# Patient Record
Sex: MALE | Race: WHITE | NOT HISPANIC OR LATINO | Employment: FULL TIME | URBAN - METROPOLITAN AREA
[De-identification: names, ages, dates, MRNs, and addresses within clinical notes are randomized per-mention and may not be internally consistent; named-entity substitution may affect disease eponyms.]

---

## 2017-03-10 LAB — HBA1C MFR BLD: 6.8 % (ref ?–5.8)

## 2017-04-10 ENCOUNTER — OFFICE VISIT (OUTPATIENT)
Dept: MEDICAL GROUP | Age: 57
End: 2017-04-10
Payer: COMMERCIAL

## 2017-04-10 VITALS
HEART RATE: 92 BPM | OXYGEN SATURATION: 96 % | BODY MASS INDEX: 34.21 KG/M2 | SYSTOLIC BLOOD PRESSURE: 130 MMHG | TEMPERATURE: 98.2 F | WEIGHT: 218 LBS | HEIGHT: 67 IN | DIASTOLIC BLOOD PRESSURE: 88 MMHG

## 2017-04-10 DIAGNOSIS — E11.69 DM TYPE 2 WITH DIABETIC DYSLIPIDEMIA (HCC): ICD-10-CM

## 2017-04-10 DIAGNOSIS — I10 ESSENTIAL HYPERTENSION: ICD-10-CM

## 2017-04-10 DIAGNOSIS — E66.9 OBESITY, CLASS II, BMI 35-39.9: ICD-10-CM

## 2017-04-10 DIAGNOSIS — E78.5 DM TYPE 2 WITH DIABETIC DYSLIPIDEMIA (HCC): ICD-10-CM

## 2017-04-10 PROCEDURE — 92250 FUNDUS PHOTOGRAPHY W/I&R: CPT | Mod: TC | Performed by: PHYSICIAN ASSISTANT

## 2017-04-10 PROCEDURE — 99214 OFFICE O/P EST MOD 30 MIN: CPT | Performed by: PHYSICIAN ASSISTANT

## 2017-04-10 RX ORDER — ASPIRIN 81 MG/1
81 TABLET, CHEWABLE ORAL DAILY
Qty: 100 TAB | COMMUNITY
Start: 2017-04-10 | End: 2023-09-18 | Stop reason: SDUPTHER

## 2017-04-10 NOTE — MR AVS SNAPSHOT
"        Jovon Solitario Chacorta   4/10/2017 4:10 PM   Office Visit   MRN: [id number]    Department:  [address] [place] [address]   Dept Phone:  [phone]    Description:  Male : 1960   Provider:  Kasandra Sun PA-C           Reason for Visit     Diabetes Mellitus     Results Blood work done on 3/10/17      Allergies as of 4/10/2017     No Known Allergies      You were diagnosed with     DM type 2 with diabetic dyslipidemia (CMS-[facility])   [291665]       Obesity, Class II, BMI 35-39.9 (CMS-HCC)   [363434]       Essential hypertension   [0219539]         Vital Signs     Blood Pressure Pulse Temperature Height Weight Body Mass Index    130/88 mmHg 92 36.8 °C (98.2 °F) 1.689 m (5' 6.5\") 98.884 kg (218 lb) 34.66 kg/m2    Oxygen Saturation Smoking Status                96% Never Smoker           Basic Information     Date Of Birth Sex Race Ethnicity Preferred Language    1960 Male White Non- English      Your appointments     Oct 16, 2017  3:30 PM   Established Patient with Kasandra Sun PA-C   [address] [id number] [phone]           You will be receiving a confirmation call a few days before your appointment from our automated call confirmation system.              Problem List              ICD-10-CM Priority Class Noted - Resolved    DM type 2 with diabetic dyslipidemia (CMS-[facility]) E11.69, E78.5   7/15/2015 - Present    Obesity, Class II, BMI 35-39.9 (CMS-HCC) E66.01   7/15/2015 - Present    Essential hypertension I10   7/15/2015 - Present    Dyslipidemia E78.5   7/15/2015 - Present    Vitamin D deficiency E55.9   2015 - Present      Health Maintenance        Date Due Completion Dates    RETINAL SCREENING 2016    IMM HEP B VACCINE (1 of 3 - Primary Series) 10/10/2017 (Originally 1960) ---    IMM DTaP/Tdap/Td Vaccine (1 - Tdap) 10/10/2017 (Originally 8/10/1981) ---    URINE ACR / MICROALBUMIN 2017, 2015   " A1C SCREENING 9/10/2017 3/10/2017, 3/10/2017, 8/5/2016, 3/15/2016, 9/29/2015, 6/26/2015, 4/23/2015    FASTING LIPID PROFILE 3/10/2018 3/10/2017, 8/5/2016, 9/29/2015, 6/26/2015, 4/27/2015    SERUM CREATININE 3/10/2018 3/10/2017, 8/5/2016, 9/29/2015, 4/27/2015    DIABETES MONOFILAMENT / LE EXAM 4/10/2018 4/10/2017, 3/16/2016, 7/15/2015 (Done)    Override on 7/15/2015: Done    COLONOSCOPY 6/25/2025 6/25/2015            Current Immunizations     Pneumococcal polysaccharide vaccine (PPSV-23) 3/15/2016 11:00 AM      Below and/or attached are the medications your provider expects you to take. Review all of your home medications and newly ordered medications with your provider and/or pharmacist. Follow medication instructions as directed by your provider and/or pharmacist. Please keep your medication list with you and share with your provider. Update the information when medications are discontinued, doses are changed, or new medications (including over-the-counter products) are added; and carry medication information at all times in the event of emergency situations     Allergies:  No Known Allergies          Medications  Valid as of: April 10, 2017 -  4:15 PM    Generic Name Brand Name Tablet Size Instructions for use    Aspirin (Chew Tab) ASA 81 MG Take 1 Tab by mouth every day.        Blood Glucose Monitoring Suppl (Device) Blood Glucose Monitoring Suppl  Dispense:Glucometer covered by patient's insurance        Blood Glucose Monitoring Suppl (Misc) Blood Glucose Monitoring Suppl SUPPLIES Test strips order: Test strips for glucometer covered by patient's insurance. Sig: use once daily and prn ssx high or low sugar.        Cholecalciferol (Tab) Vitamin D3 2000 UNITS Take  by mouth.        Lancets (Misc) Lancets  Lancets order: Lancets for glucometer covered by patient's insurance. Sig: use once daily and prn ssx high or low sugar.        Lisinopril (Tab) PRINIVIL 10 MG Take 1 Tab by mouth every day.        MetFORMIN HCl  (Tab) GLUCOPHAGE 1000 MG TAKE ONE TABLET BY MOUTH TWICE A DAY WITH MEALS (GENERIC FOR GLUCOPHAGE)        Multiple Vitamin   Take  by mouth.        Simvastatin (Tab) ZOCOR 10 MG Take 1 Tab by mouth every evening.        .                 Medicines prescribed today were sent to:     [facility][id number] - [place] [address]    Phone: [phone] Fax: [phone]    Open 24 Hours?: No      Medication refill instructions:       If your prescription bottle indicates you have medication refills left, it is not necessary to call your provider’s office. Please contact your pharmacy and they will refill your medication.    If your prescription bottle indicates you do not have any refills left, you may request refills at any time through one of the following ways: The online [employer] system (except Urgent Care), by calling your provider’s office, or by asking your pharmacy to contact your provider’s office with a refill request. Medication refills are processed only during regular business hours and may not be available until the next business day. Your provider may request additional information or to have a follow-up visit with you prior to refilling your medication.   *Please Note: Medication refills are assigned a new Rx number when refilled electronically. Your pharmacy may indicate that no refills were authorized even though a new prescription for the same medication is available at the pharmacy. Please request the medicine by name with the pharmacy before contacting your provider for a refill.        Your To Do List     Future Labs/Procedures Complete By Expires    CBC WITH DIFFERENTIAL  As directed 4/11/2018    COMP METABOLIC PANEL  As directed 4/11/2018    HEMOGLOBIN A1C  As directed 4/11/2018    LIPID PROFILE  As directed 4/11/2018    MICROALBUMIN CREAT RATIO URINE  As directed 4/11/2018      Instructions    Dr. Annika Greenberg  Phone: [phone]            [employer] Access  Code: [email or web address]  Expires: 5/10/2017  3:29 PM    [employer]  A secure, online tool to manage your health information     [employer]’s [employer]® is a secure, online tool that connects you to your personalized health information from the privacy of your home -- day or night - making it very easy for you to manage your healthcare. Once the activation process is completed, you can even access your medical information using the [employer] danay, which is available for free in the [employer] Dan[employer] store or Google Play store.     [employer] provides the following levels of access (as shown below):   My Chart Features   [facility] Primary Care Doctor [facility]  Specialists [facility]  Urgent  Care Non-[facility]  Primary Care  Doctor   Email your healthcare team securely and privately 24/7 X X X    Manage appointments: schedule your next appointment; view details of past/upcoming appointments X      Request prescription refills. X      View recent personal medical records, including lab and immunizations X X X X   View health record, including health history, allergies, medications X X X X   Read reports about your outpatient visits, procedures, consult and ER notes X X X X   See your discharge summary, which is a recap of your hospital and/or ER visit that includes your diagnosis, lab results, and care plan. X X       How to register for [employer]:  1. Go to  [email or web address].  2. Click on the Sign Up Now box, which takes you to the New Member Sign Up page. You will need to provide the following information:  a. Enter your [employer] Access Code exactly as it appears at the top of this page. (You will not need to use this code after you’ve completed the sign-up process. If you do not sign up before the expiration date, you must request a new code.)   b. Enter your date of birth.   c. Enter your home email address.   d. Click Submit, and follow the next screen’s instructions.  3. Create a [employer] ID. This will be your [employer] login ID and  cannot be changed, so think of one that is secure and easy to remember.  4. Create a [employer] password. You can change your password at any time.  5. Enter your Password Reset Question and Answer. This can be used at a later time if you forget your password.   6. Enter your e-mail address. This allows you to receive e-mail notifications when new information is available in [employer].  7. Click Sign Up. You can now view your health information.    For assistance activating your [employer] account, call [phone]

## 2017-04-10 NOTE — PROGRESS NOTES
Subjective:     Chief Complaint   Patient presents with   • Diabetes Mellitus   • Results     Blood work done on 3/10/17     Jovon Whatley is a 56 y.o. male here today for evaluation and management of:    DM type 2 with diabetic dyslipidemia (CMS-HCC)/Obesity  Stable. Currently taking metformin 1000 mg twice daily and simvastatin 10 mg nightly as directed.  Denies side effects or hypoglycemic events.  Denies side effects--no myalgias or abdominal pain.  He is monitoring blood sugar regularly at home-- a couple times a month typically under 120.    Reports diet is poor.  Reports he knows what to do he's just not doing it.  He is not exercising regularly, however, reports he has a very active job and was shocked that he is not losing weight.  Last eye exam: May 2015  D enies polyuria, polydipsia, blurred vision, or neuropathies.  He is not taking ASA daily--I recommended that he start today.  He denies dizziness, claudication, or chest pain.      Essential hypertension  Stable. Currently taking lisinopril 10 mg daily as directed.   He is not taking baby aspirin daily.   He is not monitoring BP at home.   Denies symptoms low BP: light-headed, tunnel-vision, unusual fatigue.   Denies symptoms high BP:pounding headache, visual changes, palpitations, flushed face.   Denies medicine side effects: unusual fatigue, slow heartbeat, foot/leg swelling, cough.      ROS   Denies any recent fevers or chills. No nausea or vomiting. No diarrhea. No chest pains or shortness of breath. No lower extremity edema.    No Known Allergies    Current medicines (including changes today)  Current Outpatient Prescriptions   Medication Sig Dispense Refill   • aspirin (ASA) 81 MG Chew Tab chewable tablet Take 1 Tab by mouth every day. 100 Tab    • Multiple Vitamin (MULTI VITAMIN DAILY PO) Take  by mouth.     • simvastatin (ZOCOR) 10 MG Tab Take 1 Tab by mouth every evening. 90 Tab 3   • lisinopril (PRINIVIL) 10 MG Tab Take 1 Tab by mouth  "every day. 90 Tab 4   • metformin (GLUCOPHAGE) 1000 MG tablet TAKE ONE TABLET BY MOUTH TWICE A DAY WITH MEALS (GENERIC FOR GLUCOPHAGE) 180 Tab 1   • Blood Glucose Monitoring Suppl Device Dispense:Glucometer covered by patient's insurance 1 Device 0   • Blood Glucose Monitoring Suppl SUPPLIES Misc Test strips order: Test strips for glucometer covered by patient's insurance. Sig: use once daily and prn ssx high or low sugar. 100 Each 3   • Lancets Misc Lancets order: Lancets for glucometer covered by patient's insurance. Sig: use once daily and prn ssx high or low sugar. 100 Each 3   • Cholecalciferol (VITAMIN D3) 2000 UNITS Tab Take  by mouth.       No current facility-administered medications for this visit.       Patient Active Problem List    Diagnosis Date Noted   • Vitamin D deficiency 07/23/2015   • DM type 2 with diabetic dyslipidemia (CMS-HCC) 07/15/2015   • Obesity, Class II, BMI 35-39.9 (CMS-HCC) 07/15/2015   • Essential hypertension 07/15/2015   • Dyslipidemia 07/15/2015       Family History   Problem Relation Age of Onset   • Other Father      heart valve   • Heart Disease Father    • Cancer Mother      brain cancer   • Diabetes Neg Hx    • Hypertension Neg Hx    • Hyperlipidemia Neg Hx    • Stroke Neg Hx           Objective:     Blood pressure 130/88, pulse 92, temperature 36.8 °C (98.2 °F), height 1.689 m (5' 6.5\"), weight 98.884 kg (218 lb), SpO2 96 %. Body mass index is 34.66 kg/(m^2).     Physical Exam:  Gen: Well developed, well nourished in no acute distress.  Obese male.  Skin: Pink, warm, and dry  HEENT: conjunctiva non-injected, sclera non-icteric. EOMs intact.   Neck: Supple, trachea midline.  Lungs: Effort is normal. Clear to auscultation bilaterally with good excursion.  CV: regular rate and rhythm.  Abdomen: soft, nontender, + BS.   Ext: no edema, color normal, vascularity normal, temperature normal  Monofilament testing with a 10 gram force: sensation intact: intact bilaterally  Visual " Inspection: Feet without maceration, ulcers, fissures.  Pedal pulses: intact bilaterally  Alert and oriented Eye contact is good, speech goal directed, affect calm    Lab Results   Component Value Date/Time    GLYCOHEMOGLOBIN 6.8 03/10/2017      Reviewed and discussed above labs with patient in office.      Assessment and Plan:   The following treatment plan was discussed:     1. DM type 2 with diabetic dyslipidemia (CMS-HCC) -  Stable. Continue current medicines. Monitor labs regularly.  -Monofilament exam completed in office today.  Clinic care retinal screening also completed an office today.    - Labs ordered to be completed prior to next office visit in 6 months.    - Advised patient to start aspirin 81 mg daily .  POCT Retinal Eye Exam    Diabetic Monofilament LE Exam    MICROALBUMIN CREAT RATIO URINE    HEMOGLOBIN A1C    COMP METABOLIC PANEL    CBC WITH DIFFERENTIAL    LIPID PROFILE   2. Obesity, Class II, BMI 35-39.9 (CMS-HCC) -Counseled on diet modification and exercise.    Obesity Counseling (no charge) - Patient identified as having weight management issue.  Appropriate orders and counseling given.   3. Essential hypertension -Stable. Continue current medicines. Monitor labs regularly. COMP METABOLIC PANEL     - HM: Discussed hepatitis B in Tdap vaccines today an office at great length.  He would like to defer at this time due to his needle phobia.  We will discuss again at next office visit.  Health maintenance otherwise up to date as the retinal eye exam was completed in office.  -Any change or worsening of signs or symptoms, patient encouraged to follow-up or report to emergency room for further evaluation. Patient verbalizes understanding and agrees.    Followup: Return in about 6 months (around 10/10/2017) for lab review, follow-up on DM.

## 2017-04-10 NOTE — Clinical Note
April 10, 2017       Patient: Jovon Whatley   YOB: 1960   Date of Visit: 4/10/2017         To Whom It May Concern:    It is my medical opinion that Jovon Whatley return to full duty, no restrictions. It is recommended that he wear a back support brace.    If you have any questions or concerns, please don't hesitate to call [phone]          Sincerely,          Kasandra Sun PA-C  Electronically Signed

## 2017-04-17 LAB — RETINAL SCREEN: NEGATIVE

## 2017-04-18 ENCOUNTER — TELEPHONE (OUTPATIENT)
Dept: MEDICAL GROUP | Age: 57
End: 2017-04-18

## 2017-04-18 NOTE — TELEPHONE ENCOUNTER
Phone Number Called: [phone] (home)     Message: Left VM for patient to return our call    Left Message for patient to call back: yes

## 2017-04-18 NOTE — TELEPHONE ENCOUNTER
----- Message from Kasandra Sun PA-C sent at 4/18/2017 10:09 AM PDT -----  Advise patient his eye exam was normal. We or his eyecare professional will need to recheck in 1 year

## 2017-04-19 NOTE — TELEPHONE ENCOUNTER
Phone Number Called: [phone] (home)     Message: Left message for the patient to call us back regarding the note below.    Left Message for patient to call back: yes

## 2017-04-20 NOTE — TELEPHONE ENCOUNTER
Phone Number Called: [phone] (home)     Message: I have called the patient back, returning his voicemail. Left message for him to call us back.    Left Message for patient to call back: yes

## 2017-04-20 NOTE — TELEPHONE ENCOUNTER
RADHAI    Phone Number Called: [phone] (home)     Message: Pt notified of the his eye results.    Left Message for patient to call back: no

## 2017-09-12 LAB — HBA1C MFR BLD: 6.6 % (ref ?–5.8)

## 2017-10-18 DIAGNOSIS — E78.5 DYSLIPIDEMIA: ICD-10-CM

## 2017-10-18 DIAGNOSIS — I10 ESSENTIAL HYPERTENSION: ICD-10-CM

## 2017-10-18 DIAGNOSIS — E78.5 DM TYPE 2 WITH DIABETIC DYSLIPIDEMIA (HCC): ICD-10-CM

## 2017-10-18 DIAGNOSIS — E11.69 DM TYPE 2 WITH DIABETIC DYSLIPIDEMIA (HCC): ICD-10-CM

## 2017-10-18 RX ORDER — SIMVASTATIN 10 MG
TABLET ORAL
Qty: 90 TAB | Refills: 0 | Status: SHIPPED | OUTPATIENT
Start: 2017-10-18 | End: 2018-01-22 | Stop reason: SDUPTHER

## 2017-10-18 RX ORDER — LISINOPRIL 10 MG/1
TABLET ORAL
Qty: 90 TAB | Refills: 0 | Status: SHIPPED | OUTPATIENT
Start: 2017-10-18 | End: 2018-01-22 | Stop reason: SDUPTHER

## 2017-10-24 ENCOUNTER — OFFICE VISIT (OUTPATIENT)
Dept: MEDICAL GROUP | Age: 57
End: 2017-10-24
Payer: COMMERCIAL

## 2017-10-24 VITALS
HEIGHT: 67 IN | BODY MASS INDEX: 34.37 KG/M2 | DIASTOLIC BLOOD PRESSURE: 70 MMHG | SYSTOLIC BLOOD PRESSURE: 120 MMHG | WEIGHT: 219 LBS | TEMPERATURE: 97.4 F | HEART RATE: 96 BPM | OXYGEN SATURATION: 96 %

## 2017-10-24 DIAGNOSIS — I10 ESSENTIAL HYPERTENSION: ICD-10-CM

## 2017-10-24 DIAGNOSIS — E66.9 OBESITY (BMI 30-39.9): ICD-10-CM

## 2017-10-24 DIAGNOSIS — E11.69 DM TYPE 2 WITH DIABETIC DYSLIPIDEMIA (HCC): ICD-10-CM

## 2017-10-24 DIAGNOSIS — E78.5 DM TYPE 2 WITH DIABETIC DYSLIPIDEMIA (HCC): ICD-10-CM

## 2017-10-24 DIAGNOSIS — Z12.5 SCREENING FOR PROSTATE CANCER: ICD-10-CM

## 2017-10-24 PROCEDURE — 99214 OFFICE O/P EST MOD 30 MIN: CPT | Performed by: PHYSICIAN ASSISTANT

## 2017-10-24 ASSESSMENT — PATIENT HEALTH QUESTIONNAIRE - PHQ9: CLINICAL INTERPRETATION OF PHQ2 SCORE: 0

## 2017-10-24 NOTE — PROGRESS NOTES
Subjective:     Chief Complaint   Patient presents with   • Diabetes Mellitus     Lab review     Jovon Whatley is a 57 y.o. male here today for evaluation and management of:    DM type 2 with diabetic dyslipidemia (CMS-HCC)/Obesity  Stable. Currently taking Metformin 1000 mg BID and simvastatin 10 mg as directed.  Denies side effects or hypoglycemic events.  He is monitoring blood sugar regularly at home-- a couple times a week in 130s  Reports diet is suffering  He is not exercising regularly outside of his active job.  Last eye exam: 4/17/17  Denies polyuria, polydipsia, blurred vision, or neuropathies.  He is taking ASA daily.  He denies dizziness, claudication, or chest pain.    Essential hypertension  Stable. Currently taking lisinopril 10 mg daily as directed.   He is taking baby aspirin daily.   He is not monitoring BP at home.   Denies symptoms low BP: light-headed, tunnel-vision, unusual fatigue.   Denies symptoms high BP:pounding headache, visual changes, palpitations, flushed face.   Denies medicine side effects: unusual fatigue, slow heartbeat, foot/leg swelling, cough.    ROS   Denies any recent fevers or chills. No nausea or vomiting. No diarrhea. No chest pains or shortness of breath. No lower extremity edema.    No Known Allergies    Current medicines (including changes today)  Current Outpatient Prescriptions   Medication Sig Dispense Refill   • metformin (GLUCOPHAGE) 1000 MG tablet TAKE ONE TABLET BY MOUTH TWICE A DAY WITH MEALS 180 Tab 1   • simvastatin (ZOCOR) 10 MG Tab TAKE ONE TABLET BY MOUTH EVERY EVENING 90 Tab 0   • lisinopril (PRINIVIL) 10 MG Tab TAKE ONE TABLET BY MOUTH DAILY 90 Tab 0   • aspirin (ASA) 81 MG Chew Tab chewable tablet Take 1 Tab by mouth every day. 100 Tab    • Multiple Vitamin (MULTI VITAMIN DAILY PO) Take  by mouth.     • Blood Glucose Monitoring Suppl Device Dispense:Glucometer covered by patient's insurance 1 Device 0   • Blood Glucose Monitoring Suppl SUPPLIES  "Misc Test strips order: Test strips for glucometer covered by patient's insurance. Sig: use once daily and prn ssx high or low sugar. 100 Each 3   • Lancets Misc Lancets order: Lancets for glucometer covered by patient's insurance. Sig: use once daily and prn ssx high or low sugar. 100 Each 3   • Cholecalciferol (VITAMIN D3) 2000 UNITS Tab Take  by mouth.       No current facility-administered medications for this visit.        Patient Active Problem List    Diagnosis Date Noted   • Obesity (BMI 30-39.9) 10/24/2017   • Vitamin D deficiency 07/23/2015   • DM type 2 with diabetic dyslipidemia (CMS-HCC) 07/15/2015   • Essential hypertension 07/15/2015   • Dyslipidemia 07/15/2015       Family History   Problem Relation Age of Onset   • Other Father      heart valve   • Heart Disease Father    • Cancer Mother      brain cancer   • Diabetes Neg Hx    • Hypertension Neg Hx    • Hyperlipidemia Neg Hx    • Stroke Neg Hx           Objective:     Blood pressure 120/70, pulse 96, temperature 36.3 °C (97.4 °F), height 1.689 m (5' 6.5\"), weight 99.3 kg (219 lb), SpO2 96 %. Body mass index is 34.82 kg/m².     Physical Exam:  Gen: Well developed, well nourished in no acute distress. Obese male.  Skin: Pink, warm, and dry  HEENT: conjunctiva non-injected, sclera non-icteric. EOMs intact.   Neck: Supple, trachea midline. No adenopathy or masses in the neck or supraclavicular regions.  Lungs: Effort is normal. Clear to auscultation bilaterally with good excursion.  CV: regular rate and rhythm.  Abdomen: soft, nontender, + BS. No HSM.  No CVAT  Ext: no edema, color normal, vascularity normal, temperature normal  Alert and oriented Eye contact is good, speech goal directed, affect calm    Lab Results   Component Value Date/Time    HBA1C 6.6 09/12/2017       Assessment and Plan:   The following treatment plan was discussed:     1. DM type 2 with diabetic dyslipidemia (CMS-HCC) -Stable. Continue current medicines. Monitor labs regularly. " COMP METABOLIC PANEL    LIPID PROFILE    MICROALBUMIN CREAT RATIO URINE    HEMOGLOBIN A1C   2. Essential hypertension -Stable. Continue current medicines. Monitor labs regularly. COMP METABOLIC PANEL    CBC WITH DIFFERENTIAL   3. Obesity (BMI 30-39.9) - Patient's body mass index is 34.82 kg/m². Exercise and nutrition counseling were performed at this visit.  - Encouraged diet high in fruits, vegetables, and fiber. And a diet low in salt, refined carbohydrates, cholesterol, saturated fat, and trans fatty acids.    - Encouraged 10 lbs weight loss by next appointment in April.  Patient identified as having weight management issue.  Appropriate orders and counseling given.   4. Screening for prostate cancer-discussed pros and cons with patient he would like labs ordered  PROSTATE SPECIFIC AG SCREENING     - HM: Declines Hep B and Tdap vaccines in addition to flu shot.  -Any change or worsening of signs or symptoms, patient encouraged to follow-up or report to emergency room for further evaluation. Patient verbalizes understanding and agrees.    Followup: Return in about 6 months (around 4/24/2018) for lab review. sooner if needed.

## 2018-01-22 DIAGNOSIS — E11.69 DM TYPE 2 WITH DIABETIC DYSLIPIDEMIA (HCC): ICD-10-CM

## 2018-01-22 DIAGNOSIS — E78.5 DYSLIPIDEMIA: ICD-10-CM

## 2018-01-22 DIAGNOSIS — I10 ESSENTIAL HYPERTENSION: ICD-10-CM

## 2018-01-22 DIAGNOSIS — E78.5 DM TYPE 2 WITH DIABETIC DYSLIPIDEMIA (HCC): ICD-10-CM

## 2018-01-22 RX ORDER — LISINOPRIL 10 MG/1
TABLET ORAL
Qty: 30 TAB | Refills: 0 | Status: SHIPPED | OUTPATIENT
Start: 2018-01-22 | End: 2018-02-21 | Stop reason: SDUPTHER

## 2018-01-22 RX ORDER — SIMVASTATIN 10 MG
TABLET ORAL
Qty: 30 TAB | Refills: 0 | Status: SHIPPED | OUTPATIENT
Start: 2018-01-22 | End: 2018-02-21 | Stop reason: SDUPTHER

## 2018-02-21 DIAGNOSIS — E78.5 DYSLIPIDEMIA: ICD-10-CM

## 2018-02-21 DIAGNOSIS — E11.69 DM TYPE 2 WITH DIABETIC DYSLIPIDEMIA (HCC): ICD-10-CM

## 2018-02-21 DIAGNOSIS — I10 ESSENTIAL HYPERTENSION: ICD-10-CM

## 2018-02-21 DIAGNOSIS — E78.5 DM TYPE 2 WITH DIABETIC DYSLIPIDEMIA (HCC): ICD-10-CM

## 2018-02-26 RX ORDER — LISINOPRIL 10 MG/1
TABLET ORAL
Qty: 30 TAB | Refills: 0 | Status: SHIPPED | OUTPATIENT
Start: 2018-02-26 | End: 2018-03-27 | Stop reason: SDUPTHER

## 2018-02-26 RX ORDER — SIMVASTATIN 10 MG
TABLET ORAL
Qty: 30 TAB | Refills: 0 | Status: SHIPPED | OUTPATIENT
Start: 2018-02-26 | End: 2018-03-27 | Stop reason: SDUPTHER

## 2018-03-17 ENCOUNTER — APPOINTMENT (OUTPATIENT)
Dept: RADIOLOGY | Facility: IMAGING CENTER | Age: 58
End: 2018-03-17
Attending: NURSE PRACTITIONER
Payer: COMMERCIAL

## 2018-03-17 ENCOUNTER — OFFICE VISIT (OUTPATIENT)
Dept: URGENT CARE | Facility: PHYSICIAN GROUP | Age: 58
End: 2018-03-17
Payer: COMMERCIAL

## 2018-03-17 VITALS
BODY MASS INDEX: 36 KG/M2 | WEIGHT: 224 LBS | OXYGEN SATURATION: 95 % | SYSTOLIC BLOOD PRESSURE: 130 MMHG | DIASTOLIC BLOOD PRESSURE: 80 MMHG | HEART RATE: 94 BPM | TEMPERATURE: 98 F | HEIGHT: 66 IN

## 2018-03-17 DIAGNOSIS — M54.2 CERVICAL PAIN (NECK): ICD-10-CM

## 2018-03-17 DIAGNOSIS — M62.838 MUSCLE SPASMS OF NECK: ICD-10-CM

## 2018-03-17 DIAGNOSIS — M50.30 DEGENERATIVE CERVICAL DISC: ICD-10-CM

## 2018-03-17 PROCEDURE — 72040 X-RAY EXAM NECK SPINE 2-3 VW: CPT | Mod: TC | Performed by: NURSE PRACTITIONER

## 2018-03-17 PROCEDURE — 99214 OFFICE O/P EST MOD 30 MIN: CPT | Performed by: NURSE PRACTITIONER

## 2018-03-17 RX ORDER — VALACYCLOVIR HYDROCHLORIDE 1 G/1
1000 TABLET, FILM COATED ORAL 3 TIMES DAILY
Qty: 21 TAB | Refills: 0 | Status: SHIPPED | OUTPATIENT
Start: 2018-03-17 | End: 2018-03-17 | Stop reason: CLARIF

## 2018-03-17 RX ORDER — CYCLOBENZAPRINE HCL 10 MG
10 TABLET ORAL
Qty: 15 TAB | Refills: 0 | Status: SHIPPED | OUTPATIENT
Start: 2018-03-17 | End: 2018-04-24

## 2018-03-17 ASSESSMENT — PAIN SCALES - GENERAL: PAINLEVEL: 9=SEVERE PAIN

## 2018-03-17 ASSESSMENT — ENCOUNTER SYMPTOMS
CHILLS: 0
TINGLING: 0
NUMBNESS: 0
NAUSEA: 0
SHORTNESS OF BREATH: 0
EYE PAIN: 0
SORE THROAT: 0
MYALGIAS: 0
TROUBLE SWALLOWING: 0
FEVER: 0
PHOTOPHOBIA: 0
DIZZINESS: 0
NECK PAIN: 1
VOMITING: 0
VISUAL CHANGE: 0

## 2018-03-17 NOTE — LETTER
March 17, 2018         Patient: Jovon Whatley   YOB: 1960   Date of Visit: 3/17/2018           To Whom it May Concern:    Jovon Whatley was seen in my clinic on 3/17/2018. He may return to work on 3/18/18 with no lifting of heavy objects for 2 weeks.    If you have any questions or concerns, please don't hesitate to call.        Sincerely,           FIGUEROA Andino.  Electronically Signed

## 2018-03-17 NOTE — PROGRESS NOTES
"  Subjective:     Jovon Whatley is a 57 y.o. male who presents for Neck Pain (\"cracking\" about a month ago, and then it locked up yesterday, been excruciating since )       Neck Pain    This is a new problem. The current episode started yesterday. The problem occurs constantly. The problem has been unchanged. The pain is associated with a twisting injury (\"cracked his neck). The pain is present in the left side. The pain is at a severity of 9/10. The pain is severe. The symptoms are aggravated by twisting and position. The pain is same all the time. Stiffness is present all day. Pertinent negatives include no chest pain, fever, numbness, photophobia, tingling, trouble swallowing or visual change. He has tried NSAIDs for the symptoms. The treatment provided no relief.     Past Medical History:   Diagnosis Date   • Diabetes (CMS-HCC)    • Hyperlipidemia    • Hypertension      Past Surgical History:   Procedure Laterality Date   • ANKLE ORIF Right    • APPENDECTOMY       Social History     Social History   • Marital status:      Spouse name: N/A   • Number of children: N/A   • Years of education: N/A     Occupational History   • Not on file.     Social History Main Topics   • Smoking status: Never Smoker   • Smokeless tobacco: Never Used   • Alcohol use No   • Drug use: No   • Sexual activity: No     Other Topics Concern   • Not on file     Social History Narrative   • No narrative on file      Family History   Problem Relation Age of Onset   • Other Father      heart valve   • Heart Disease Father    • Cancer Mother      brain cancer   • Diabetes Neg Hx    • Hypertension Neg Hx    • Hyperlipidemia Neg Hx    • Stroke Neg Hx     Review of Systems   Constitutional: Negative for chills and fever.   HENT: Negative for sore throat and trouble swallowing.    Eyes: Negative for photophobia and pain.   Respiratory: Negative for shortness of breath.    Cardiovascular: Negative for chest pain.   Gastrointestinal: " "Negative for nausea and vomiting.   Genitourinary: Negative for hematuria.   Musculoskeletal: Positive for neck pain. Negative for myalgias.   Skin: Negative for rash.   Neurological: Negative for dizziness, tingling and numbness.   No Known Allergies   Objective:   /80   Pulse 94   Temp 36.7 °C (98 °F)   Ht 1.676 m (5' 6\")   Wt 101.6 kg (224 lb)   SpO2 95%   BMI 36.15 kg/m²   Physical Exam   Constitutional: He is oriented to person, place, and time. He appears well-developed and well-nourished. No distress.   HENT:   Head: Normocephalic and atraumatic.   Eyes: Conjunctivae and EOM are normal. Pupils are equal, round, and reactive to light.   Cardiovascular: Normal rate and regular rhythm.    No murmur heard.  Pulmonary/Chest: Effort normal and breath sounds normal. No respiratory distress.   Abdominal: Soft. He exhibits no distension. There is no tenderness.   Musculoskeletal:        Cervical back: He exhibits decreased range of motion, tenderness, pain and spasm. He exhibits no swelling and no edema.   Spine- without midline tenderness, step-off or deformity. Without scoliosis or kyphosis. Without noted paraspinous spasm. DROM with lateral bend, and flexion/extension.Sensation intact bilaterally, BLE motor 5/5 and symmetrical  strength.   Neurological: He is alert and oriented to person, place, and time. He has normal reflexes. No sensory deficit.   Skin: Skin is warm and dry.   Psychiatric: He has a normal mood and affect.   Vitals reviewed.        Assessment/Plan:   Assessment    1. Cervical pain (neck)  DX-CERVICAL SPINE-2 OR 3 VIEWS    cyclobenzaprine (FLEXERIL) 10 MG Tab    REFERRAL TO SPINE SURGERY   2. Muscle spasms of neck  cyclobenzaprine (FLEXERIL) 10 MG Tab    REFERRAL TO SPINE SURGERY   3. Degenerative cervical disc       Degenerative changes as above described.  If clinical suspicion for fracture is high, further evaluation can be performed with CT.    Clinical suspicion low for fracture. " Patient having no direct trauma.  Avoid bending, stooping, heavy or repetitive lifting until symptoms resolve.  Begin gentle stretching before activity when tolerated.  Did provide patient with a work letter advising no heavy lifting ×2 weeks.     Will have patient follow up with [facility] for further evaluation and management of cervical neck pain. Advised patient taking Tylenol and/or ibuprofen for pain or discomfort.     Differential diagnosis, natural history, supportive care, and indications for immediate follow-up discussed.

## 2018-03-27 DIAGNOSIS — E78.5 DYSLIPIDEMIA: ICD-10-CM

## 2018-03-27 DIAGNOSIS — E78.5 DM TYPE 2 WITH DIABETIC DYSLIPIDEMIA (HCC): ICD-10-CM

## 2018-03-27 DIAGNOSIS — I10 ESSENTIAL HYPERTENSION: ICD-10-CM

## 2018-03-27 DIAGNOSIS — E11.69 DM TYPE 2 WITH DIABETIC DYSLIPIDEMIA (HCC): ICD-10-CM

## 2018-03-30 RX ORDER — SIMVASTATIN 10 MG
TABLET ORAL
Qty: 30 TAB | Refills: 0 | Status: SHIPPED | OUTPATIENT
Start: 2018-03-30 | End: 2018-04-25 | Stop reason: SDUPTHER

## 2018-03-30 RX ORDER — LISINOPRIL 10 MG/1
TABLET ORAL
Qty: 30 TAB | Refills: 0 | Status: SHIPPED | OUTPATIENT
Start: 2018-03-30 | End: 2018-04-25 | Stop reason: SDUPTHER

## 2018-04-18 ENCOUNTER — HOSPITAL ENCOUNTER (OUTPATIENT)
Dept: LAB | Facility: MEDICAL CENTER | Age: 58
End: 2018-04-18
Attending: PHYSICIAN ASSISTANT
Payer: COMMERCIAL

## 2018-04-18 DIAGNOSIS — Z12.5 SCREENING FOR PROSTATE CANCER: ICD-10-CM

## 2018-04-18 DIAGNOSIS — E11.69 DM TYPE 2 WITH DIABETIC DYSLIPIDEMIA (HCC): ICD-10-CM

## 2018-04-18 DIAGNOSIS — I10 ESSENTIAL HYPERTENSION: ICD-10-CM

## 2018-04-18 DIAGNOSIS — E78.5 DM TYPE 2 WITH DIABETIC DYSLIPIDEMIA (HCC): ICD-10-CM

## 2018-04-18 LAB
ALBUMIN SERPL BCP-MCNC: 4.4 G/DL (ref 3.2–4.9)
ALBUMIN/GLOB SERPL: 1.6 G/DL
ALP SERPL-CCNC: 63 U/L (ref 30–99)
ALT SERPL-CCNC: 41 U/L (ref 2–50)
ANION GAP SERPL CALC-SCNC: 9 MMOL/L (ref 0–11.9)
AST SERPL-CCNC: 25 U/L (ref 12–45)
BASOPHILS # BLD AUTO: 0.6 % (ref 0–1.8)
BASOPHILS # BLD: 0.07 K/UL (ref 0–0.12)
BILIRUB SERPL-MCNC: 0.6 MG/DL (ref 0.1–1.5)
BUN SERPL-MCNC: 13 MG/DL (ref 8–22)
CALCIUM SERPL-MCNC: 9.5 MG/DL (ref 8.5–10.5)
CHLORIDE SERPL-SCNC: 104 MMOL/L (ref 96–112)
CHOLEST SERPL-MCNC: 120 MG/DL (ref 100–199)
CO2 SERPL-SCNC: 24 MMOL/L (ref 20–33)
CREAT SERPL-MCNC: 0.8 MG/DL (ref 0.5–1.4)
CREAT UR-MCNC: 225.4 MG/DL
EOSINOPHIL # BLD AUTO: 0.17 K/UL (ref 0–0.51)
EOSINOPHIL NFR BLD: 1.5 % (ref 0–6.9)
ERYTHROCYTE [DISTWIDTH] IN BLOOD BY AUTOMATED COUNT: 43.8 FL (ref 35.9–50)
EST. AVERAGE GLUCOSE BLD GHB EST-MCNC: 174 MG/DL
GLOBULIN SER CALC-MCNC: 2.7 G/DL (ref 1.9–3.5)
GLUCOSE SERPL-MCNC: 159 MG/DL (ref 65–99)
HBA1C MFR BLD: 7.7 % (ref 0–5.6)
HCT VFR BLD AUTO: 45.4 % (ref 42–52)
HDLC SERPL-MCNC: 37 MG/DL
HGB BLD-MCNC: 14.6 G/DL (ref 14–18)
IMM GRANULOCYTES # BLD AUTO: 0.04 K/UL (ref 0–0.11)
IMM GRANULOCYTES NFR BLD AUTO: 0.4 % (ref 0–0.9)
LDLC SERPL CALC-MCNC: 52 MG/DL
LYMPHOCYTES # BLD AUTO: 2.06 K/UL (ref 1–4.8)
LYMPHOCYTES NFR BLD: 18.5 % (ref 22–41)
MCH RBC QN AUTO: 27.8 PG (ref 27–33)
MCHC RBC AUTO-ENTMCNC: 32.2 G/DL (ref 33.7–35.3)
MCV RBC AUTO: 86.3 FL (ref 81.4–97.8)
MICROALBUMIN UR-MCNC: 1.3 MG/DL
MICROALBUMIN/CREAT UR: 6 MG/G (ref 0–30)
MONOCYTES # BLD AUTO: 1.2 K/UL (ref 0–0.85)
MONOCYTES NFR BLD AUTO: 10.8 % (ref 0–13.4)
NEUTROPHILS # BLD AUTO: 7.61 K/UL (ref 1.82–7.42)
NEUTROPHILS NFR BLD: 68.2 % (ref 44–72)
NRBC # BLD AUTO: 0 K/UL
NRBC BLD-RTO: 0 /100 WBC
PLATELET # BLD AUTO: 243 K/UL (ref 164–446)
PMV BLD AUTO: 11.2 FL (ref 9–12.9)
POTASSIUM SERPL-SCNC: 3.9 MMOL/L (ref 3.6–5.5)
PROT SERPL-MCNC: 7.1 G/DL (ref 6–8.2)
PSA SERPL-MCNC: 1.07 NG/ML (ref 0–4)
RBC # BLD AUTO: 5.26 M/UL (ref 4.7–6.1)
SODIUM SERPL-SCNC: 137 MMOL/L (ref 135–145)
TRIGL SERPL-MCNC: 156 MG/DL (ref 0–149)
WBC # BLD AUTO: 11.2 K/UL (ref 4.8–10.8)

## 2018-04-18 PROCEDURE — 36415 COLL VENOUS BLD VENIPUNCTURE: CPT

## 2018-04-18 PROCEDURE — 80053 COMPREHEN METABOLIC PANEL: CPT

## 2018-04-18 PROCEDURE — 80061 LIPID PANEL: CPT

## 2018-04-18 PROCEDURE — 84153 ASSAY OF PSA TOTAL: CPT

## 2018-04-18 PROCEDURE — 83036 HEMOGLOBIN GLYCOSYLATED A1C: CPT

## 2018-04-18 PROCEDURE — 82043 UR ALBUMIN QUANTITATIVE: CPT

## 2018-04-18 PROCEDURE — 82570 ASSAY OF URINE CREATININE: CPT

## 2018-04-18 PROCEDURE — 85025 COMPLETE CBC W/AUTO DIFF WBC: CPT

## 2018-04-24 ENCOUNTER — OFFICE VISIT (OUTPATIENT)
Dept: MEDICAL GROUP | Age: 58
End: 2018-04-24
Payer: COMMERCIAL

## 2018-04-24 VITALS
BODY MASS INDEX: 35.58 KG/M2 | HEIGHT: 66 IN | HEART RATE: 77 BPM | TEMPERATURE: 98.4 F | WEIGHT: 221.4 LBS | SYSTOLIC BLOOD PRESSURE: 132 MMHG | DIASTOLIC BLOOD PRESSURE: 78 MMHG | OXYGEN SATURATION: 97 %

## 2018-04-24 DIAGNOSIS — E11.69 DM TYPE 2 WITH DIABETIC DYSLIPIDEMIA (HCC): ICD-10-CM

## 2018-04-24 DIAGNOSIS — E78.5 DM TYPE 2 WITH DIABETIC DYSLIPIDEMIA (HCC): ICD-10-CM

## 2018-04-24 DIAGNOSIS — I10 ESSENTIAL HYPERTENSION: ICD-10-CM

## 2018-04-24 DIAGNOSIS — E66.9 OBESITY (BMI 30-39.9): ICD-10-CM

## 2018-04-24 DIAGNOSIS — J06.9 ACUTE URI: ICD-10-CM

## 2018-04-24 PROCEDURE — 99214 OFFICE O/P EST MOD 30 MIN: CPT | Performed by: PHYSICIAN ASSISTANT

## 2018-04-24 NOTE — PROGRESS NOTES
Subjective:     Chief Complaint   Patient presents with   • Diabetes Mellitus   • Hypertension   • Results     Lab review     Jovon Whatley is a 57 y.o. male here today for evaluation and management of:    DM type 2 with diabetic dyslipidemia (CMS-HCC)/Obesity  Stable. Currently taking Metformin 1000 mg BID and simvastatin 10 mg as directed.  Denies side effects or hypoglycemic events.  He is monitoring blood sugar regularly at home-- a couple times a week-- 113-166 FBS  Reports diet is suffering but reports he is trying to eat salmon and broccoli and aspargus. States he is trying to follow Mediterranean diet.   He has an active job. 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   Last eye exam: 4/17/17-- deferred here in office.   Denies polyuria, blurred vision, or neuropathies. Reports dry mouth and polydipsia--thought was secondary to medications.   He is taking ASA daily.  He denies dizziness, claudication, or chest pain.     Essential hypertension  Stable. Currently taking lisinopril 10 mg daily as directed.   He is taking baby aspirin daily.   He is not monitoring BP at home.   Denies symptoms low BP: light-headed, tunnel-vision, unusual fatigue.   Denies symptoms high BP:pounding headache, visual changes, palpitations, flushed face.   Denies medicine side effects: unusual fatigue, slow heartbeat, foot/leg swelling, cough.    URI  Reports has had cold like symptoms for about 10 days. States he has runny nose, throat pain, headache and slight cough.  Reports he has tried some Advil-- helped with headache  Unsure about fever--hasn't checked  Overall feels like he is improving.    ROS   Denies any recent fevers or chills. No nausea or vomiting. No diarrhea. No chest pains or shortness of breath. No lower extremity edema.    No Known Allergies    Current medicines (including changes today)  Current Outpatient Prescriptions   Medication Sig Dispense Refill   • metformin (GLUCOPHAGE)  "1000 MG tablet Take 1 tab PO QAM and 1.5 tabs QHS with meals 225 Tab 1   • simvastatin (ZOCOR) 10 MG Tab TAKE ONE TABLET BY MOUTH EVERY EVENING 30 Tab 0   • lisinopril (PRINIVIL) 10 MG Tab TAKE ONE TABLET BY MOUTH DAILY 30 Tab 0   • aspirin (ASA) 81 MG Chew Tab chewable tablet Take 1 Tab by mouth every day. 100 Tab    • Multiple Vitamin (MULTI VITAMIN DAILY PO) Take  by mouth.     • Blood Glucose Monitoring Suppl Device Dispense:Glucometer covered by patient's insurance 1 Device 0   • Blood Glucose Monitoring Suppl SUPPLIES Misc Test strips order: Test strips for glucometer covered by patient's insurance. Sig: use once daily and prn ssx high or low sugar. 100 Each 3   • Lancets Misc Lancets order: Lancets for glucometer covered by patient's insurance. Sig: use once daily and prn ssx high or low sugar. 100 Each 3   • Cholecalciferol (VITAMIN D3) 2000 UNITS Tab Take  by mouth.       No current facility-administered medications for this visit.        Patient Active Problem List    Diagnosis Date Noted   • Obesity (BMI 30-39.9) 10/24/2017   • Vitamin D deficiency 07/23/2015   • DM type 2 with diabetic dyslipidemia (CMS-[facility]) 07/15/2015   • Essential hypertension 07/15/2015   • Dyslipidemia 07/15/2015       Family History   Problem Relation Age of Onset   • Other Father      heart valve   • Heart Disease Father    • Cancer Mother      brain cancer   • Diabetes Neg Hx    • Hypertension Neg Hx    • Hyperlipidemia Neg Hx    • Stroke Neg Hx           Objective:     Vitals:    04/24/18 1012   BP: 132/78   Pulse: 77   Temp: 36.9 °C (98.4 °F)   SpO2: 97%   Weight: 100.4 kg (221 lb 6.4 oz)   Height: 1.676 m (5' 6\")     Body mass index is 35.73 kg/m².     Physical Exam:  Gen: Well developed, well nourished in no acute distress.  Obese male.  Skin: Pink, warm, and dry  HEENT: conjunctiva non-injected, sclera non-icteric. EOMs intact.   Nasal mucosa without edema nor erythema. No facial tenderness  Pinna normal. TM pearly gray. "   Oral mucous membranes pink and moist with no lesions.  Neck: Supple, trachea midline. No adenopathy or masses in the neck or supraclavicular regions.  Lungs: Effort is normal. Clear to auscultation bilaterally with good excursion.  CV: regular rate and rhythm.  Abdomen: soft, nontender, + BS. No HSM.  No CVAT  Ext: no edema, color normal, vascularity normal, temperature normal  Monofilament testing with a 10 gram force: sensation intact: intact bilaterally  Visual Inspection: Feet without maceration, ulcers, fissures.  Pedal pulses: intact bilaterally  Alert and oriented Eye contact is good, speech goal directed, affect calm    Results for orders placed or performed during the hospital encounter of 04/18/18   COMP METABOLIC PANEL   Result Value Ref Range    Sodium 137 135 - 145 mmol/L    Potassium 3.9 3.6 - 5.5 mmol/L    Chloride 104 96 - 112 mmol/L    Co2 24 20 - 33 mmol/L    Anion Gap 9.0 0.0 - 11.9    Glucose 159 (H) 65 - 99 mg/dL    Bun 13 8 - 22 mg/dL    Creatinine 0.80 0.50 - 1.40 mg/dL    Calcium 9.5 8.5 - 10.5 mg/dL    AST(SGOT) 25 12 - 45 U/L    ALT(SGPT) 41 2 - 50 U/L    Alkaline Phosphatase 63 30 - 99 U/L    Total Bilirubin 0.6 0.1 - 1.5 mg/dL    Albumin 4.4 3.2 - 4.9 g/dL    Total Protein 7.1 6.0 - 8.2 g/dL    Globulin 2.7 1.9 - 3.5 g/dL    A-G Ratio 1.6 g/dL   CBC WITH DIFFERENTIAL   Result Value Ref Range    WBC 11.2 (H) 4.8 - 10.8 K/uL    RBC 5.26 4.70 - 6.10 M/uL    Hemoglobin 14.6 14.0 - 18.0 g/dL    Hematocrit 45.4 42.0 - 52.0 %    MCV 86.3 81.4 - 97.8 fL    MCH 27.8 27.0 - 33.0 pg    MCHC 32.2 (L) 33.7 - 35.3 g/dL    RDW 43.8 35.9 - 50.0 fL    Platelet Count 243 164 - 446 K/uL    MPV 11.2 9.0 - 12.9 fL    Neutrophils-Polys 68.20 44.00 - 72.00 %    Lymphocytes 18.50 (L) 22.00 - 41.00 %    Monocytes 10.80 0.00 - 13.40 %    Eosinophils 1.50 0.00 - 6.90 %    Basophils 0.60 0.00 - 1.80 %    Immature Granulocytes 0.40 0.00 - 0.90 %    Nucleated RBC 0.00 /100 WBC    Neutrophils (Absolute) 7.61 (H) 1.82  - 7.42 K/uL    Lymphs (Absolute) 2.06 1.00 - 4.80 K/uL    Monos (Absolute) 1.20 (H) 0.00 - 0.85 K/uL    Eos (Absolute) 0.17 0.00 - 0.51 K/uL    Baso (Absolute) 0.07 0.00 - 0.12 K/uL    Immature Granulocytes (abs) 0.04 0.00 - 0.11 K/uL    NRBC (Absolute) 0.00 K/uL   LIPID PROFILE   Result Value Ref Range    Cholesterol,Tot 120 100 - 199 mg/dL    Triglycerides 156 (H) 0 - 149 mg/dL    HDL 37 (A) >=40 mg/dL    LDL 52 <100 mg/dL   PROSTATE SPECIFIC AG SCREENING   Result Value Ref Range    Prostatic Specific Antigen Tot 1.07 0.00 - 4.00 ng/mL   MICROALBUMIN CREAT RATIO URINE   Result Value Ref Range    Creatinine, Urine 225.40 mg/dL    Microalbumin, Urine Random 1.3 mg/dL    Micro Alb Creat Ratio 6 0 - 30 mg/g   HEMOGLOBIN A1C   Result Value Ref Range    Glycohemoglobin 7.7 (H) 0.0 - 5.6 %    Est Avg Glucose 174 mg/dL   ESTIMATED GFR   Result Value Ref Range    GFR If African American >60 >60 mL/min/1.73 m 2    GFR If Non African American >60 >60 mL/min/1.73 m 2     Reviewed and discussed above labs with patient in office.      Assessment and Plan:   The following treatment plan was discussed:     1. DM type 2 with diabetic dyslipidemia (HCC) -uncontrolled.  Stressed the importance of diet modification and exercise.  We will try a more conservative approach at this time, however, if he continues to become more adequate control we will need to add more medications.  We will increase his Metformin to 1000 mg in the morning and 1500 mg in the evening.  He denies any GI upset.  He will return to clinic in 3 months with point-of-care A1c test. Diabetic Monofilament LE Exam   2. Essential hypertension -Stable. Continue current medicines. Monitor labs regularly.    3. Obesity (BMI 30-39.9) - Patient's body mass index is 35.73 kg/m². Exercise and nutrition counseling were performed at this visit.    4. Acute URI -Discussed over-the-counter medications to use for symptomatic relief. Keep well-hydrated and rest.    Followup if no  improvement in symptoms in 1-2 weeks, sooner if any worsening symptoms.        - HM: Offered and how [facility] retinal screening, however, he reports it was too expensive in the past.  He will follow-up with his eye care specialist.  -Any change or worsening of signs or symptoms, patient encouraged to follow-up or report to emergency room for further evaluation. Patient verbalizes understanding and agrees.    Followup: Return in about 3 months (around 7/24/2018) for follow-up on DM .  Sooner if needed.         This dictation was created using voice recognition software. The accuracy of the dictation is limited to the abilities of the software. I expect there may be some errors of grammar and possibly content.

## 2018-04-25 DIAGNOSIS — I10 ESSENTIAL HYPERTENSION: ICD-10-CM

## 2018-04-25 DIAGNOSIS — E78.5 DM TYPE 2 WITH DIABETIC DYSLIPIDEMIA (HCC): ICD-10-CM

## 2018-04-25 DIAGNOSIS — E11.69 DM TYPE 2 WITH DIABETIC DYSLIPIDEMIA (HCC): ICD-10-CM

## 2018-04-25 DIAGNOSIS — E78.5 DYSLIPIDEMIA: ICD-10-CM

## 2018-04-25 RX ORDER — SIMVASTATIN 10 MG
TABLET ORAL
Qty: 90 TAB | Refills: 3 | Status: SHIPPED | OUTPATIENT
Start: 2018-04-25 | End: 2019-01-29 | Stop reason: SDUPTHER

## 2018-04-25 RX ORDER — LISINOPRIL 10 MG/1
TABLET ORAL
Qty: 90 TAB | Refills: 3 | Status: SHIPPED | OUTPATIENT
Start: 2018-04-25 | End: 2019-01-29 | Stop reason: SDUPTHER

## 2018-07-30 ENCOUNTER — OFFICE VISIT (OUTPATIENT)
Dept: MEDICAL GROUP | Age: 58
End: 2018-07-30
Payer: COMMERCIAL

## 2018-07-30 VITALS
DIASTOLIC BLOOD PRESSURE: 74 MMHG | OXYGEN SATURATION: 96 % | BODY MASS INDEX: 35.62 KG/M2 | HEART RATE: 92 BPM | WEIGHT: 221.6 LBS | SYSTOLIC BLOOD PRESSURE: 116 MMHG | HEIGHT: 66 IN | TEMPERATURE: 98.1 F

## 2018-07-30 DIAGNOSIS — E66.9 OBESITY (BMI 30-39.9): ICD-10-CM

## 2018-07-30 DIAGNOSIS — Z00.00 WELL ADULT EXAM: ICD-10-CM

## 2018-07-30 DIAGNOSIS — E78.5 DM TYPE 2 WITH DIABETIC DYSLIPIDEMIA (HCC): ICD-10-CM

## 2018-07-30 DIAGNOSIS — E11.69 DM TYPE 2 WITH DIABETIC DYSLIPIDEMIA (HCC): ICD-10-CM

## 2018-07-30 DIAGNOSIS — I10 ESSENTIAL HYPERTENSION: ICD-10-CM

## 2018-07-30 LAB
HBA1C MFR BLD: 6.9 % (ref ?–5.8)
INT CON NEG: NEGATIVE
INT CON POS: POSITIVE

## 2018-07-30 PROCEDURE — 99396 PREV VISIT EST AGE 40-64: CPT | Performed by: PHYSICIAN ASSISTANT

## 2018-07-30 PROCEDURE — 83036 HEMOGLOBIN GLYCOSYLATED A1C: CPT | Performed by: PHYSICIAN ASSISTANT

## 2018-07-30 NOTE — PROGRESS NOTES
"Subjective:     CC:   Chief Complaint   Patient presents with   • Follow-Up   • Diabetes       HPI:   Jovon Whatley is a 57 y.o. male who presents for an annual exam    Last colonoscopy: 0215  Last Tdap:  Due but declines  Hx STDs: no  Regular exercise: yes, plays softball on occasion, rides dirt bike, very active at work.  Diet:\"terrible\"    He  has a past medical history of Diabetes (HCC); Hyperlipidemia; and Hypertension.  He  has a past surgical history that includes ankle orif (Right) and appendectomy.  Family History   Problem Relation Age of Onset   • Other Father         heart valve   • Heart Disease Father    • Cancer Mother         brain cancer   • Diabetes Neg Hx    • Hypertension Neg Hx    • Hyperlipidemia Neg Hx    • Stroke Neg Hx      Social History   Substance Use Topics   • Smoking status: Never Smoker   • Smokeless tobacco: Never Used   • Alcohol use No       Patient Active Problem List    Diagnosis Date Noted   • Obesity (BMI 30-39.9) 10/24/2017   • Vitamin D deficiency 07/23/2015   • DM type 2 with diabetic dyslipidemia (HCC) 07/15/2015   • Essential hypertension 07/15/2015   • Dyslipidemia 07/15/2015       Current Outpatient Prescriptions   Medication Sig Dispense Refill   • simvastatin (ZOCOR) 10 MG Tab TAKE ONE TABLET BY MOUTH EVERY EVENING 90 Tab 3   • lisinopril (PRINIVIL) 10 MG Tab TAKE ONE TABLET BY MOUTH DAILY 90 Tab 3   • metformin (GLUCOPHAGE) 1000 MG tablet Take 1 tab PO QAM and 1.5 tabs QHS with meals 225 Tab 1   • aspirin (ASA) 81 MG Chew Tab chewable tablet Take 1 Tab by mouth every day. 100 Tab    • Multiple Vitamin (MULTI VITAMIN DAILY PO) Take  by mouth.     • Blood Glucose Monitoring Suppl Device Dispense:Glucometer covered by patient's insurance 1 Device 0   • Blood Glucose Monitoring Suppl SUPPLIES Misc Test strips order: Test strips for glucometer covered by patient's insurance. Sig: use once daily and prn ssx high or low sugar. 100 Each 3   • Lancets Misc Lancets " "order: Lancets for glucometer covered by patient's insurance. Sig: use once daily and prn ssx high or low sugar. 100 Each 3   • Cholecalciferol (VITAMIN D3) 2000 UNITS Tab Take  by mouth.       No current facility-administered medications for this visit.     (including changes today)  Allergies: Patient has no known allergies.    Review of Systems    Constitutional: Negative for fever, chills and malaise/fatigue.   HENT: Negative for congestion.    Eyes: Negative for pain.   Respiratory: Negative for cough and shortness of breath.    Cardiovascular: Negative for leg swelling.   Gastrointestinal: Negative for nausea, vomiting, abdominal pain and diarrhea.   Genitourinary: Negative for dysuria and hematuria.   Skin: Negative for rash.   Neurological: Negative for dizziness, focal weakness and headaches.   Endo/Heme/Allergies: Does not bruise/bleed easily.   Psychiatric/Behavioral: Negative for depression.  The patient is not nervous/anxious.      Objective:     Vitals:    07/30/18 1416   BP: 116/74   Pulse: 92   Temp: 36.7 °C (98.1 °F)   SpO2: 96%   Weight: 100.5 kg (221 lb 9.6 oz)   Height: 1.676 m (5' 6\")     Body mass index is 35.77 kg/m².   Wt Readings from Last 4 Encounters:   07/30/18 100.5 kg (221 lb 9.6 oz)   04/24/18 100.4 kg (221 lb 6.4 oz)   03/17/18 101.6 kg (224 lb)   10/24/17 99.3 kg (219 lb)       Physical Exam:  Constitutional: Well-developed and well-nourished. Not diaphoretic. No distress. Obese male.   Skin: Skin is warm and dry. No rash noted.  Head: Atraumatic without lesions.  Eyes: Conjunctivae and extraocular motions are normal. Pupils are equal, round, and reactive to light. No scleral icterus.   Ears:  External ears unremarkable. Tympanic membranes clear and intact.  Nose: Nares patent. Septum midline. Turbinates without erythema nor edema. No discharge.   Mouth/Throat: Dentition is fair. Tongue normal. Oropharynx is clear and moist. Posterior pharynx without erythema or exudates.  Neck: " Supple, trachea midline. Normal range of motion. No thyromegaly present.. No lymphadenopathy--cervical or supraclavicular  Cardiovascular: Regular rate and rhythm, S1 and S2 without murmur, rubs, or gallops.    Chest: Effort normal. Clear to auscultation throughout. No adventitious sounds. No CVA tenderness.  Abdomen: Soft, non tender, and without distention. Active bowel sounds in all four quadrants. No rebound, guarding, masses or HSM.  Extremities: No cyanosis, clubbing, erythema, nor edema. Distal pulses intact and symmetric.   Neurological: Alert and oriented x 3.  Psychiatric:  Behavior, mood, and affect are appropriate.    Results for orders placed or performed in visit on 07/30/18   POCT Hemoglobin A1C   Result Value Ref Range    Glycohemoglobin 6.9 %    Internal Control Negative Negative     Internal Control Positive Positive      Reviewed and discussed above lab with patient in office.      Assessment and Plan:     1. Well adult exam -Counseling about diet, exercise, skin care    2. DM type 2 with diabetic dyslipidemia (HCC) - improved.Continue current medicines. Monitor labs regularly. - Encouraged diet high in fruits, vegetables, and fiber. And a diet low in salt, refined carbohydrates, cholesterol, saturated fat, and trans fatty acids.    - Encouraged  a minimum of 30 minutes of moderate intensity aerobic exercise (eg, brisk walking) is recommended on five days each week. Or 20 minutes of vigorous-intensity aerobic exercise (eg, jogging) on three days each week.    COMP METABOLIC PANEL    LIPID PROFILE    HEMOGLOBIN A1C    MICROALBUMIN CREAT RATIO URINE   3. Essential hypertension -Stable. Continue current medicines. Monitor labs regularly. COMP METABOLIC PANEL    CBC WITH DIFFERENTIAL   4. Obesity (BMI 30-39.9) -Patient's body mass index is 35.77 kg/m². Exercise and nutrition counseling were performed at this visit.  Offered referral to HIP, however, pt declines at this time.  Obesity Counseling (no  charge) - Patient identified as having weight management issue.  Appropriate orders and counseling given.         HM: Declines POCT retinal exam (last was normal-- good x 2 years). Will have done through eye care professional.     Follow-up: Return in about 5 months (around 12/17/2018) for lab review, sooner if needed.    This dictation was created using voice recognition software. The accuracy of the dictation is limited to the abilities of the software. I expect there may be some errors of grammar and possibly content. The MA notes were reviewed and certain aspects of this information were incorporated into this note.

## 2018-11-19 ENCOUNTER — NON-PROVIDER VISIT (OUTPATIENT)
Dept: OCCUPATIONAL MEDICINE | Facility: CLINIC | Age: 58
End: 2018-11-19

## 2018-11-19 DIAGNOSIS — Z02.1 PRE-EMPLOYMENT DRUG SCREENING: ICD-10-CM

## 2018-11-19 LAB
AMP AMPHETAMINE: NORMAL
COC COCAINE: NORMAL
INT CON NEG: NORMAL
INT CON POS: NORMAL
MET METHAMPHETAMINES: NORMAL
OPI OPIATES: NORMAL
PCP PHENCYCLIDINE: NORMAL
POC DRUG COMMENT 753798-OCCUPATIONAL HEALTH: NEGATIVE
THC: NORMAL

## 2018-11-19 PROCEDURE — 8895 PB URINE 6 PANEL AFTER HOURS: Performed by: INTERNAL MEDICINE

## 2018-11-19 PROCEDURE — 80305 DRUG TEST PRSMV DIR OPT OBS: CPT | Performed by: INTERNAL MEDICINE

## 2019-01-17 ENCOUNTER — HOSPITAL ENCOUNTER (OUTPATIENT)
Dept: LAB | Facility: MEDICAL CENTER | Age: 59
End: 2019-01-17
Attending: PHYSICIAN ASSISTANT
Payer: COMMERCIAL

## 2019-01-17 DIAGNOSIS — E11.69 DM TYPE 2 WITH DIABETIC DYSLIPIDEMIA (HCC): ICD-10-CM

## 2019-01-17 DIAGNOSIS — E78.5 DM TYPE 2 WITH DIABETIC DYSLIPIDEMIA (HCC): ICD-10-CM

## 2019-01-17 DIAGNOSIS — I10 ESSENTIAL HYPERTENSION: ICD-10-CM

## 2019-01-17 LAB
ALBUMIN SERPL BCP-MCNC: 4.7 G/DL (ref 3.2–4.9)
ALBUMIN/GLOB SERPL: 1.6 G/DL
ALP SERPL-CCNC: 74 U/L (ref 30–99)
ALT SERPL-CCNC: 37 U/L (ref 2–50)
ANION GAP SERPL CALC-SCNC: 11 MMOL/L (ref 0–11.9)
AST SERPL-CCNC: 21 U/L (ref 12–45)
BASOPHILS # BLD AUTO: 0.5 % (ref 0–1.8)
BASOPHILS # BLD: 0.06 K/UL (ref 0–0.12)
BILIRUB SERPL-MCNC: 0.4 MG/DL (ref 0.1–1.5)
BUN SERPL-MCNC: 16 MG/DL (ref 8–22)
CALCIUM SERPL-MCNC: 9.6 MG/DL (ref 8.5–10.5)
CHLORIDE SERPL-SCNC: 104 MMOL/L (ref 96–112)
CHOLEST SERPL-MCNC: 116 MG/DL (ref 100–199)
CO2 SERPL-SCNC: 23 MMOL/L (ref 20–33)
CREAT SERPL-MCNC: 0.85 MG/DL (ref 0.5–1.4)
CREAT UR-MCNC: 144.3 MG/DL
EOSINOPHIL # BLD AUTO: 0.22 K/UL (ref 0–0.51)
EOSINOPHIL NFR BLD: 1.9 % (ref 0–6.9)
ERYTHROCYTE [DISTWIDTH] IN BLOOD BY AUTOMATED COUNT: 41.7 FL (ref 35.9–50)
EST. AVERAGE GLUCOSE BLD GHB EST-MCNC: 160 MG/DL
GLOBULIN SER CALC-MCNC: 2.9 G/DL (ref 1.9–3.5)
GLUCOSE SERPL-MCNC: 155 MG/DL (ref 65–99)
HBA1C MFR BLD: 7.2 % (ref 0–5.6)
HCT VFR BLD AUTO: 46.1 % (ref 42–52)
HDLC SERPL-MCNC: 37 MG/DL
HGB BLD-MCNC: 14.9 G/DL (ref 14–18)
IMM GRANULOCYTES # BLD AUTO: 0.06 K/UL (ref 0–0.11)
IMM GRANULOCYTES NFR BLD AUTO: 0.5 % (ref 0–0.9)
LDLC SERPL CALC-MCNC: 52 MG/DL
LYMPHOCYTES # BLD AUTO: 2.41 K/UL (ref 1–4.8)
LYMPHOCYTES NFR BLD: 20.3 % (ref 22–41)
MCH RBC QN AUTO: 26.9 PG (ref 27–33)
MCHC RBC AUTO-ENTMCNC: 32.3 G/DL (ref 33.7–35.3)
MCV RBC AUTO: 83.2 FL (ref 81.4–97.8)
MICROALBUMIN UR-MCNC: <0.7 MG/DL
MICROALBUMIN/CREAT UR: NORMAL MG/G (ref 0–30)
MONOCYTES # BLD AUTO: 1.05 K/UL (ref 0–0.85)
MONOCYTES NFR BLD AUTO: 8.9 % (ref 0–13.4)
NEUTROPHILS # BLD AUTO: 8.05 K/UL (ref 1.82–7.42)
NEUTROPHILS NFR BLD: 67.9 % (ref 44–72)
NRBC # BLD AUTO: 0 K/UL
NRBC BLD-RTO: 0 /100 WBC
PLATELET # BLD AUTO: 274 K/UL (ref 164–446)
PMV BLD AUTO: 11.1 FL (ref 9–12.9)
POTASSIUM SERPL-SCNC: 4.4 MMOL/L (ref 3.6–5.5)
PROT SERPL-MCNC: 7.6 G/DL (ref 6–8.2)
RBC # BLD AUTO: 5.54 M/UL (ref 4.7–6.1)
SODIUM SERPL-SCNC: 138 MMOL/L (ref 135–145)
TRIGL SERPL-MCNC: 137 MG/DL (ref 0–149)
WBC # BLD AUTO: 11.9 K/UL (ref 4.8–10.8)

## 2019-01-17 PROCEDURE — 80053 COMPREHEN METABOLIC PANEL: CPT

## 2019-01-17 PROCEDURE — 82570 ASSAY OF URINE CREATININE: CPT

## 2019-01-17 PROCEDURE — 80061 LIPID PANEL: CPT

## 2019-01-17 PROCEDURE — 36415 COLL VENOUS BLD VENIPUNCTURE: CPT

## 2019-01-17 PROCEDURE — 82043 UR ALBUMIN QUANTITATIVE: CPT

## 2019-01-17 PROCEDURE — 85025 COMPLETE CBC W/AUTO DIFF WBC: CPT

## 2019-01-17 PROCEDURE — 83036 HEMOGLOBIN GLYCOSYLATED A1C: CPT

## 2019-01-29 ENCOUNTER — OFFICE VISIT (OUTPATIENT)
Dept: MEDICAL GROUP | Age: 59
End: 2019-01-29
Payer: COMMERCIAL

## 2019-01-29 VITALS
BODY MASS INDEX: 36.32 KG/M2 | SYSTOLIC BLOOD PRESSURE: 128 MMHG | WEIGHT: 226 LBS | TEMPERATURE: 97.2 F | HEART RATE: 86 BPM | HEIGHT: 66 IN | OXYGEN SATURATION: 93 % | DIASTOLIC BLOOD PRESSURE: 82 MMHG

## 2019-01-29 DIAGNOSIS — E66.9 OBESITY (BMI 30-39.9): ICD-10-CM

## 2019-01-29 DIAGNOSIS — Z00.00 PE (PHYSICAL EXAM), ANNUAL: Primary | ICD-10-CM

## 2019-01-29 DIAGNOSIS — E78.5 DYSLIPIDEMIA: ICD-10-CM

## 2019-01-29 DIAGNOSIS — E11.9 TYPE 2 DIABETES MELLITUS WITHOUT COMPLICATION, WITHOUT LONG-TERM CURRENT USE OF INSULIN (HCC): ICD-10-CM

## 2019-01-29 DIAGNOSIS — I10 ESSENTIAL HYPERTENSION: ICD-10-CM

## 2019-01-29 PROCEDURE — 99396 PREV VISIT EST AGE 40-64: CPT | Performed by: FAMILY MEDICINE

## 2019-01-29 RX ORDER — SIMVASTATIN 10 MG
TABLET ORAL
Qty: 90 TAB | Refills: 3 | Status: SHIPPED | OUTPATIENT
Start: 2019-01-29 | End: 2020-01-27 | Stop reason: SDUPTHER

## 2019-01-29 RX ORDER — LISINOPRIL 10 MG/1
TABLET ORAL
Qty: 90 TAB | Refills: 3 | Status: SHIPPED | OUTPATIENT
Start: 2019-01-29 | End: 2020-01-27 | Stop reason: SDUPTHER

## 2019-01-29 ASSESSMENT — PATIENT HEALTH QUESTIONNAIRE - PHQ9: CLINICAL INTERPRETATION OF PHQ2 SCORE: 0

## 2019-01-29 NOTE — PROGRESS NOTES
Subjective:   CC: Annual PE    HPI:     Jovon Whatley is a 58 y.o. male who is an established patient of the clinic, presents with the following concerns:     The patient is here for follow up of labs completed on 1/17/19 and for follow up of chronic medical problems. History of type II diabetes which is well controlled with Metformin 1000 mg twice daily. He has a history of hypertension which is treated with Lisinopril 10 mg once daily. He has a history of dyslipidemia which is treated with Simvastatin 10 mg once daily. He is compliant with his medications and is having no side effects from them. Overall, the patient is doing well and denies any acute medical complaints including rash, open wounds or ulcers to his feet or numbness to his feet. He does report a 10 lbs weight gain due to the holidays. He declined having the tetanus or influenza vaccines administered today.        Current medicines (including changes today)  Current Outpatient Prescriptions   Medication Sig Dispense Refill   • metformin (GLUCOPHAGE) 1000 MG tablet TAKE ONE TABLET BY MOUTH EVERY MORNING AND TAKE 1 AND 1/2 TABLETS EVERY NIGHT AT BEDTIME. TAKE WITH MEALS 180 Tab 3   • lisinopril (PRINIVIL) 10 MG Tab TAKE ONE TABLET BY MOUTH DAILY 90 Tab 3   • simvastatin (ZOCOR) 10 MG Tab TAKE ONE TABLET BY MOUTH EVERY EVENING 90 Tab 3   • aspirin (ASA) 81 MG Chew Tab chewable tablet Take 1 Tab by mouth every day. 100 Tab    • Multiple Vitamin (MULTI VITAMIN DAILY PO) Take  by mouth.     • Blood Glucose Monitoring Suppl Device Dispense:Glucometer covered by patient's insurance 1 Device 0   • Blood Glucose Monitoring Suppl SUPPLIES Misc Test strips order: Test strips for glucometer covered by patient's insurance. Sig: use once daily and prn ssx high or low sugar. 100 Each 3   • Lancets Misc Lancets order: Lancets for glucometer covered by patient's insurance. Sig: use once daily and prn ssx high or low sugar. 100 Each 3   • Cholecalciferol (VITAMIN  "D3) 2000 UNITS Tab Take  by mouth.       No current facility-administered medications for this visit.      He  has a past medical history of Diabetes (HCC); Hyperlipidemia; and Hypertension.    I personally reviewed patient's problem list, allergies, medications, family hx, social hx with patient and update EPIC.     REVIEW OF SYSTEMS:  CONSTITUTIONAL:  Denies night sweats, fatigue, malaise, lethargy, fever or chills.  RESPIRATORY:  Denies cough, wheeze, hemoptysis or shortness of breath.  CARDIOVASCULAR:  Denies chest pains, palpitations or pedal edema.  SKIN: Denies rash to feet, open wounds/ulcers to feet.  NEUROLOGICAL: Denies numbness to feet.    See [facility] for further details.       Objective:     Blood pressure 128/82, pulse 86, temperature 36.2 °C (97.2 °F), temperature source Temporal, height 1.676 m (5' 6\"), weight 102.5 kg (226 lb), SpO2 93 %. Body mass index is 36.48 kg/m².    Physical Exam:  Constitutional: awake, alert, in no distress.  Skin: Warm, dry, good turgor, no rashes, bruises, ulcers in visible areas.  Eye: conjunctiva clear, lids neg for edema or lesions.  Neck: Trachea midline, no masses, no thyromegaly. No cervical or supraclavicular lymphadenopathy  Respiratory: Unlabored respiratory effort, lungs clear to auscultation, no wheezes, no rales.  Cardiovascular: Normal S1, S2, no murmur, no pedal edema.  Psych: Oriented x3, affect and mood wnl, intact judgement and insight.       Assessment and Plan:   The following treatment plan was discussed:    1. DM type 2  Chronic, controlled with metformin thousand milligrams twice daily, recent A1c was 7.2.  -Continue metformin 1000 mg twice daily  - Discussed dietary modification, exercise, weight loss  - Annual eye exam  - Regular foot exam  - Discussed vaccines recommendations: PPSV 23 and hepatitis B.   - Side effects of all medications discussed with patient  - Follow up in 6 months.     2. Dyslipidemia  Chronic, controlled with Zocor 10 mg daily, " will continue.  - simvastatin (ZOCOR) 10 MG Tab; TAKE ONE TABLET BY MOUTH EVERY EVENING  Dispense: 90 Tab; Refill: 3    3. Essential hypertension  Chronic, controlled with lisinopril 10 mg daily, will continue.  - lisinopril (PRINIVIL) 10 MG Tab; TAKE ONE TABLET BY MOUTH DAILY  Dispense: 90 Tab; Refill: 3  - Pt was counseled on dietary modification, weight loss, smoking cessation, and avoidance of excessive alcohol consumption.    - Recommended moderate intensity exercise at least 30 minutes per day x 5 days per week.     4. Obesity (BMI 30-39.9)  Patient is obese, BMI of 36.48 today.  Patient has gained approximately 10 pounds over the past few months due to holidays.  Patient is motivated to work on dietary and lifestyle modification for weight loss.  He declined further treatment or intervention for weight loss at this time.  We will continue routine monitoring.  Appropriate counseling provided.    5. PE (physical exam), annual  - pt is counseled on diet, exercise, vitamin supplement, mental health, sleep, stress  - discussed preventive cares and vaccine recommendations.       Sanaz Trujillo M.D.    Follow up: Return in about 6 months (around 7/29/2019) for Diabetes, follow up with PCP.    Please note that this dictation was created using voice recognition software and/or scribes. I have made every reasonable attempt to correct obvious errors, but I expect that there are errors of grammar and possibly content that I did not discover before finalizing the note.     Alejandra NEGRETE (Saraiibe), am scribing for, and in the presence of, Sanaz Trujillo M.D.    Electronically signed by: Alejandra Colón (Saraiibgarrett), 1/29/2019    ISanaz M.D. personally performed the services described in this documentation, as scribed by Alejandra Colón in my presence, and it is both accurate and complete.

## 2019-05-25 ENCOUNTER — APPOINTMENT (OUTPATIENT)
Dept: RADIOLOGY | Facility: IMAGING CENTER | Age: 59
End: 2019-05-25
Attending: NURSE PRACTITIONER
Payer: COMMERCIAL

## 2019-05-25 ENCOUNTER — OFFICE VISIT (OUTPATIENT)
Dept: URGENT CARE | Facility: PHYSICIAN GROUP | Age: 59
End: 2019-05-25
Payer: COMMERCIAL

## 2019-05-25 VITALS
OXYGEN SATURATION: 95 % | WEIGHT: 225.6 LBS | TEMPERATURE: 98.3 F | BODY MASS INDEX: 36.26 KG/M2 | HEART RATE: 82 BPM | SYSTOLIC BLOOD PRESSURE: 146 MMHG | HEIGHT: 66 IN | DIASTOLIC BLOOD PRESSURE: 88 MMHG

## 2019-05-25 DIAGNOSIS — S80.10XA CONTUSION OF LOWER LEG, UNSPECIFIED LATERALITY, INITIAL ENCOUNTER: ICD-10-CM

## 2019-05-25 PROCEDURE — 73590 X-RAY EXAM OF LOWER LEG: CPT | Mod: TC,RT | Performed by: NURSE PRACTITIONER

## 2019-05-25 PROCEDURE — 99213 OFFICE O/P EST LOW 20 MIN: CPT | Performed by: NURSE PRACTITIONER

## 2019-05-25 NOTE — PROGRESS NOTES
"Subjective:      Jovon Whatley is a 58 y.o. male who presents with Leg Pain (pt was playing soft ball and ball hit shin x2 times, R leg pain, swelling, tender, x1 week )    Past Medical History:   Diagnosis Date   • Diabetes (HCC)    • Hyperlipidemia    • Hypertension      Social History     Social History   • Marital status:      Spouse name: N/A   • Number of children: N/A   • Years of education: N/A     Occupational History   • Not on file.     Social History Main Topics   • Smoking status: Never Smoker   • Smokeless tobacco: Never Used   • Alcohol use No   • Drug use: No   • Sexual activity: No     Other Topics Concern   • Not on file     Social History Narrative   • No narrative on file     Family History   Problem Relation Age of Onset   • Other Father         heart valve   • Heart Disease Father    • Cancer Mother         brain cancer   • Diabetes Neg Hx    • Hypertension Neg Hx    • Hyperlipidemia Neg Hx    • Stroke Neg Hx        Allergies: Patient has no known allergies.    Patient is a 58-year-old male who presents today with complaint of pain to the anterior left leg.  9 days ago he was hit twice with a baseball in the shin.  He states he has been having pain since.        Other   This is a new problem. The current episode started in the past 7 days. The problem occurs constantly. The problem has been unchanged. Nothing aggravates the symptoms. He has tried nothing for the symptoms. The treatment provided no relief.       Review of Systems   Musculoskeletal:        Leg pain and contusion   All other systems reviewed and are negative.         Objective:     /88 (BP Location: Right arm, Patient Position: Sitting, BP Cuff Size: Adult)   Pulse 82   Temp 36.8 °C (98.3 °F) (Temporal)   Ht 1.676 m (5' 6\")   Wt 102.3 kg (225 lb 9.6 oz)   SpO2 95%   BMI 36.41 kg/m²      Physical Exam   Constitutional: He appears well-developed and well-nourished.   Vitals reviewed.              5/25/2019 " 9:21 AM    HISTORY/REASON FOR EXAM:  Right lower leg pain after injury      TECHNIQUE/EXAM DESCRIPTION AND NUMBER OF VIEWS:  2 views of the RIGHT tibia and fibula.    COMPARISON:  No comparison available    FINDINGS:  Bone mineralization is normal.  There is no evidence of fracture or dislocation.  There are 2 traction screws extending through the medial malleolus into the proximal tibia.   Impression       No evidence of fracture or dislocation.       Assessment/Plan:     1. Contusion of lower leg, unspecified laterality, initial encounter    Ice  Elevate  Ibuprofen  Rest  Follow-up for no improvement within the next 7 to 10 days

## 2019-07-29 ENCOUNTER — OFFICE VISIT (OUTPATIENT)
Dept: MEDICAL GROUP | Age: 59
End: 2019-07-29
Payer: COMMERCIAL

## 2019-07-29 VITALS
HEIGHT: 66 IN | DIASTOLIC BLOOD PRESSURE: 76 MMHG | BODY MASS INDEX: 35.52 KG/M2 | TEMPERATURE: 97.9 F | SYSTOLIC BLOOD PRESSURE: 112 MMHG | HEART RATE: 87 BPM | OXYGEN SATURATION: 98 % | WEIGHT: 221 LBS

## 2019-07-29 DIAGNOSIS — I10 ESSENTIAL HYPERTENSION: ICD-10-CM

## 2019-07-29 DIAGNOSIS — E78.5 DM TYPE 2 WITH DIABETIC DYSLIPIDEMIA (HCC): ICD-10-CM

## 2019-07-29 DIAGNOSIS — E66.9 OBESITY (BMI 30-39.9): ICD-10-CM

## 2019-07-29 DIAGNOSIS — Z11.59 NEED FOR HEPATITIS C SCREENING TEST: ICD-10-CM

## 2019-07-29 DIAGNOSIS — E11.69 DM TYPE 2 WITH DIABETIC DYSLIPIDEMIA (HCC): ICD-10-CM

## 2019-07-29 PROBLEM — E11.9 TYPE 2 DIABETES MELLITUS WITHOUT COMPLICATION, WITHOUT LONG-TERM CURRENT USE OF INSULIN (HCC): Status: RESOLVED | Noted: 2019-01-29 | Resolved: 2019-07-29

## 2019-07-29 LAB
HBA1C MFR BLD: 7.2 % (ref 0–5.6)
INT CON NEG: NEGATIVE
INT CON POS: POSITIVE

## 2019-07-29 PROCEDURE — 83036 HEMOGLOBIN GLYCOSYLATED A1C: CPT | Performed by: PHYSICIAN ASSISTANT

## 2019-07-29 PROCEDURE — 99214 OFFICE O/P EST MOD 30 MIN: CPT | Performed by: PHYSICIAN ASSISTANT

## 2019-07-29 NOTE — PATIENT INSTRUCTIONS
CALL SAMANTHA            If you have questions or other concerns around this termination, we recommend reaching out to your Human Resources Department. Also you may contact [facility] directly at  [phone] or  [email or web address] for a list of in-network providers. If you would like to reach out to [facility] directly for additional questions or concerns, please email us at [email or web address].

## 2019-07-29 NOTE — LETTER
[facility]  Kasandra Sun P.A.-C.  [address]  [place] [address]  Fax: [phone]   Authorization for Release/Disclosure of   Protected Health Information   Name: JOVON PALMA : 1960 SSN: [id number]   Address: [address] [place] [address] [address]  [place] [address] Phone:    [phone] (home)    I authorize the entity listed below to release/disclose the PHI below to:   [facility]/Kasandra Sun P.A.-C. and Kasandra Sun P.A.-C.   Provider or Entity Name:  [facility]   Address   City, State, [facility]   Phone:      Fax:     Reason for request: continuity of care   Information to be released:    [  ] LAST COLONOSCOPY,  including any PATH REPORT and follow-up  [  ] LAST FIT/COLOGUARD RESULT [  ] LAST DEXA  [  ] LAST MAMMOGRAM  [  ] LAST PAP  [  ] LAST LABS [ X] RETINA EXAM REPORT  [  ] IMMUNIZATION RECORDS  [  ] Release all info      [  ] Check here and initial the line next to each item to release ALL health information INCLUDING  _____ Care and treatment for drug and / or alcohol abuse  _____ HIV testing, infection status, or AIDS  _____ Genetic Testing    DATES OF SERVICE OR TIME PERIOD TO BE DISCLOSED: _____________  I understand and acknowledge that:  * This Authorization may be revoked at any time by you in writing, except if your health information has already been used or disclosed.  * Your health information that will be used or disclosed as a result of you signing this authorization could be re-disclosed by the recipient. If this occurs, your re-disclosed health information may no longer be protected by State or Federal laws.  * You may refuse to sign this Authorization. Your refusal will not affect your ability to obtain treatment.  * This Authorization becomes effective upon signing and will  on (date) __________.      If no date is indicated, this Authorization will  one (1) year from the signature date.    Name: Jovon Palma    Signature:   Date:     2019            PLEASE FAX REQUESTED RECORDS BACK TO: [phone]

## 2019-08-02 NOTE — PROGRESS NOTES
Subjective:     Chief Complaint   Patient presents with   • Follow-Up   • Diabetes   • Hypertension   • Other     Dyslipidemia      Jovon Whatley is a 58 y.o. male here today for evaluation and management of:    DM type 2 with diabetic dyslipidemia (HCC)/Obesity (BMI 30-39.9)  Stable. Currently taking metformin 1000 mg AM in and 1500 mg pm, simvastatin 10 mg nightly and ASA 81 mg nightly as directed.  Denies side effects or hypoglycemic events.  Denies side effects--no myalgias or abdominal pain.  He is not monitoring blood sugar regularly at home.  Reports diet is the same; no major changes. States [employer] for breakfast nearly every day.   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  Last eye exam: 10/17/18 negative for retinopathy  Denies polyuria, polydipsia, blurred vision, or neuropathies.  He denies dizziness, claudication, or chest pain.    Essential hypertension  Stable. Currently taking lisinopril 10 mg daily as directed.   He is monitoring BP at home.   Denies symptoms low BP: light-headed, tunnel-vision, unusual fatigue.   Denies symptoms high BP:pounding headache, visual changes, palpitations, flushed face.   Denies medicine side effects: unusual fatigue, slow heartbeat, foot/leg swelling, cough.      ROS   Denies any recent fevers or chills. No nausea or vomiting. No diarrhea. No chest pains or shortness of breath. No lower extremity edema.    No Known Allergies    Current medicines (including changes today)  Current Outpatient Medications   Medication Sig Dispense Refill   • metformin (GLUCOPHAGE) 1000 MG tablet TAKE ONE TABLET BY MOUTH EVERY MORNING AND TAKE 1 AND 1/2 TABLETS EVERY NIGHT AT BEDTIME. TAKE WITH MEALS 180 Tab 3   • lisinopril (PRINIVIL) 10 MG Tab TAKE ONE TABLET BY MOUTH DAILY 90 Tab 3   • simvastatin (ZOCOR) 10 MG Tab TAKE ONE TABLET BY MOUTH EVERY EVENING 90 Tab 3   • aspirin (ASA) 81 MG Chew Tab chewable tablet Take 1 Tab by mouth  "every day. 100 Tab    • Multiple Vitamin (MULTI VITAMIN DAILY PO) Take  by mouth.     • Blood Glucose Monitoring Suppl Device Dispense:Glucometer covered by patient's insurance 1 Device 0   • Blood Glucose Monitoring Suppl SUPPLIES Misc Test strips order: Test strips for glucometer covered by patient's insurance. Sig: use once daily and prn ssx high or low sugar. 100 Each 3   • Lancets Misc Lancets order: Lancets for glucometer covered by patient's insurance. Sig: use once daily and prn ssx high or low sugar. 100 Each 3   • Cholecalciferol (VITAMIN D3) 2000 UNITS Tab Take  by mouth.       No current facility-administered medications for this visit.        Patient Active Problem List    Diagnosis Date Noted   • Obesity (BMI 30-39.9) 10/24/2017   • DM type 2 with diabetic dyslipidemia (HCC) 07/15/2015   • Essential hypertension 07/15/2015   • Dyslipidemia 07/15/2015       Family History   Problem Relation Age of Onset   • Other Father         heart valve   • Heart Disease Father    • Cancer Mother         brain cancer   • Diabetes Neg Hx    • Hypertension Neg Hx    • Hyperlipidemia Neg Hx    • Stroke Neg Hx           Objective:     Vitals:    07/29/19 1029   BP: 112/76   BP Location: Right arm   Patient Position: Sitting   BP Cuff Size: Adult   Pulse: 87   Temp: 36.6 °C (97.9 °F)   TempSrc: *RETIRED* Temporal   SpO2: 98%   Weight: 100.2 kg (221 lb)   Height: 1.676 m (5' 6\")     Body mass index is 35.67 kg/m².     Physical Exam:  Gen: Well developed, well nourished in no acute distress. Obese male  Skin: Pink, warm, and dry  HEENT: conjunctiva non-injected, sclera non-icteric. EOMs intact.   Neck: Supple, trachea midline. No adenopathy or masses in the neck or supraclavicular regions.Carotids without bruits.  Lungs: Effort is normal. Clear to auscultation bilaterally with good excursion.  CV: regular rate and rhythm.  Abdomen: soft, nontender, + BS. No HSM.  No CVAT  Ext: no edema, color normal, vascularity normal, " temperature normal  Monofilament testing with a 10 gram force: sensation intact: intact bilaterally  Visual Inspection: Feet without maceration, ulcers, fissures.  Pedal pulses: intact bilaterally    Alert and oriented Eye contact is good, speech goal directed, affect calm    Results for orders placed or performed in visit on 07/29/19   POCT Hemoglobin A1C   Result Value Ref Range    Glycohemoglobin 7.2 (A) 0.0 - 5.6 %    Internal Control Negative Negative     Internal Control Positive Positive      Reviewed and discussed above labs with patient in office.      Assessment and Plan:   The following treatment plan was discussed:     1. DM type 2 with diabetic dyslipidemia (HCC)  - Stable. Would like to see his A1c below 7. However, strongly believe he could achieve this through dietary modification. Strongly recommended that he make some changes.   - considering adding medication to promote weight loss. Will reassess at next visit.   - Monofilament done today in office.   - POCT Hemoglobin A1C  - Comp Metabolic Panel; Future  - CBC WITH DIFFERENTIAL; Future  - Lipid Profile; Future  - MICROALBUMIN CREAT RATIO URINE; Future  - HEMOGLOBIN A1C; Future  - Diabetic Monofilament Lower Extremity Exam    2. Obesity (BMI 30-39.9)  -Patient's body mass index is 35.67 kg/m². Exercise and nutrition counseling were performed at this visit.    3. Essential hypertension  - Stable. Continue current medicines. Monitor labs regularly.  - Comp Metabolic Panel; Future    4. Need for hepatitis C screening test  - HEP C VIRUS ANTIBODY; Future    -Any change or worsening of signs or symptoms, patient encouraged to follow-up or report to emergency room for further evaluation. Patient verbalizes understanding and agrees.    Followup: Return in about 6 months (around 1/29/2020) for lab review, sooner if needed.

## 2020-01-16 ENCOUNTER — HOSPITAL ENCOUNTER (OUTPATIENT)
Dept: LAB | Facility: MEDICAL CENTER | Age: 60
End: 2020-01-16
Attending: PHYSICIAN ASSISTANT
Payer: COMMERCIAL

## 2020-01-16 DIAGNOSIS — I10 ESSENTIAL HYPERTENSION: ICD-10-CM

## 2020-01-16 DIAGNOSIS — E78.5 DM TYPE 2 WITH DIABETIC DYSLIPIDEMIA (HCC): ICD-10-CM

## 2020-01-16 DIAGNOSIS — E11.69 DM TYPE 2 WITH DIABETIC DYSLIPIDEMIA (HCC): ICD-10-CM

## 2020-01-16 DIAGNOSIS — Z11.59 NEED FOR HEPATITIS C SCREENING TEST: ICD-10-CM

## 2020-01-16 LAB
ALBUMIN SERPL BCP-MCNC: 4.5 G/DL (ref 3.2–4.9)
ALBUMIN/GLOB SERPL: 1.6 G/DL
ALP SERPL-CCNC: 79 U/L (ref 30–99)
ALT SERPL-CCNC: 29 U/L (ref 2–50)
ANION GAP SERPL CALC-SCNC: 8 MMOL/L (ref 0–11.9)
AST SERPL-CCNC: 15 U/L (ref 12–45)
BASOPHILS # BLD AUTO: 0.7 % (ref 0–1.8)
BASOPHILS # BLD: 0.09 K/UL (ref 0–0.12)
BILIRUB SERPL-MCNC: 0.4 MG/DL (ref 0.1–1.5)
BUN SERPL-MCNC: 14 MG/DL (ref 8–22)
CALCIUM SERPL-MCNC: 9.5 MG/DL (ref 8.5–10.5)
CHLORIDE SERPL-SCNC: 103 MMOL/L (ref 96–112)
CHOLEST SERPL-MCNC: 131 MG/DL (ref 100–199)
CO2 SERPL-SCNC: 25 MMOL/L (ref 20–33)
CREAT SERPL-MCNC: 0.75 MG/DL (ref 0.5–1.4)
CREAT UR-MCNC: 86.6 MG/DL
EOSINOPHIL # BLD AUTO: 0.23 K/UL (ref 0–0.51)
EOSINOPHIL NFR BLD: 1.8 % (ref 0–6.9)
ERYTHROCYTE [DISTWIDTH] IN BLOOD BY AUTOMATED COUNT: 41.1 FL (ref 35.9–50)
EST. AVERAGE GLUCOSE BLD GHB EST-MCNC: 180 MG/DL
FASTING STATUS PATIENT QL REPORTED: NORMAL
GLOBULIN SER CALC-MCNC: 2.8 G/DL (ref 1.9–3.5)
GLUCOSE SERPL-MCNC: 169 MG/DL (ref 65–99)
HBA1C MFR BLD: 7.9 % (ref 0–5.6)
HCT VFR BLD AUTO: 46.8 % (ref 42–52)
HCV AB SER QL: NEGATIVE
HDLC SERPL-MCNC: 37 MG/DL
HGB BLD-MCNC: 14.9 G/DL (ref 14–18)
IMM GRANULOCYTES # BLD AUTO: 0.11 K/UL (ref 0–0.11)
IMM GRANULOCYTES NFR BLD AUTO: 0.9 % (ref 0–0.9)
LDLC SERPL CALC-MCNC: 56 MG/DL
LYMPHOCYTES # BLD AUTO: 2.72 K/UL (ref 1–4.8)
LYMPHOCYTES NFR BLD: 21.9 % (ref 22–41)
MCH RBC QN AUTO: 27 PG (ref 27–33)
MCHC RBC AUTO-ENTMCNC: 31.8 G/DL (ref 33.7–35.3)
MCV RBC AUTO: 84.8 FL (ref 81.4–97.8)
MICROALBUMIN UR-MCNC: <0.7 MG/DL
MICROALBUMIN/CREAT UR: NORMAL MG/G (ref 0–30)
MONOCYTES # BLD AUTO: 1.12 K/UL (ref 0–0.85)
MONOCYTES NFR BLD AUTO: 9 % (ref 0–13.4)
NEUTROPHILS # BLD AUTO: 8.17 K/UL (ref 1.82–7.42)
NEUTROPHILS NFR BLD: 65.7 % (ref 44–72)
NRBC # BLD AUTO: 0 K/UL
NRBC BLD-RTO: 0 /100 WBC
PLATELET # BLD AUTO: 293 K/UL (ref 164–446)
PMV BLD AUTO: 11.1 FL (ref 9–12.9)
POTASSIUM SERPL-SCNC: 4.2 MMOL/L (ref 3.6–5.5)
PROT SERPL-MCNC: 7.3 G/DL (ref 6–8.2)
RBC # BLD AUTO: 5.52 M/UL (ref 4.7–6.1)
SODIUM SERPL-SCNC: 136 MMOL/L (ref 135–145)
TRIGL SERPL-MCNC: 188 MG/DL (ref 0–149)
WBC # BLD AUTO: 12.4 K/UL (ref 4.8–10.8)

## 2020-01-16 PROCEDURE — 82570 ASSAY OF URINE CREATININE: CPT

## 2020-01-16 PROCEDURE — 36415 COLL VENOUS BLD VENIPUNCTURE: CPT

## 2020-01-16 PROCEDURE — 80061 LIPID PANEL: CPT

## 2020-01-16 PROCEDURE — 80053 COMPREHEN METABOLIC PANEL: CPT

## 2020-01-16 PROCEDURE — 83036 HEMOGLOBIN GLYCOSYLATED A1C: CPT

## 2020-01-16 PROCEDURE — 82043 UR ALBUMIN QUANTITATIVE: CPT

## 2020-01-16 PROCEDURE — 86803 HEPATITIS C AB TEST: CPT

## 2020-01-16 PROCEDURE — 85025 COMPLETE CBC W/AUTO DIFF WBC: CPT

## 2020-01-27 ENCOUNTER — OFFICE VISIT (OUTPATIENT)
Dept: MEDICAL GROUP | Age: 60
End: 2020-01-27
Payer: COMMERCIAL

## 2020-01-27 VITALS
SYSTOLIC BLOOD PRESSURE: 128 MMHG | HEIGHT: 65 IN | TEMPERATURE: 98.1 F | HEART RATE: 81 BPM | OXYGEN SATURATION: 96 % | BODY MASS INDEX: 36.75 KG/M2 | WEIGHT: 220.6 LBS | DIASTOLIC BLOOD PRESSURE: 88 MMHG

## 2020-01-27 DIAGNOSIS — E66.9 OBESITY (BMI 30-39.9): ICD-10-CM

## 2020-01-27 DIAGNOSIS — E11.69 DM TYPE 2 WITH DIABETIC DYSLIPIDEMIA (HCC): ICD-10-CM

## 2020-01-27 DIAGNOSIS — E78.5 DM TYPE 2 WITH DIABETIC DYSLIPIDEMIA (HCC): ICD-10-CM

## 2020-01-27 DIAGNOSIS — D72.829 LEUKOCYTOSIS, UNSPECIFIED TYPE: ICD-10-CM

## 2020-01-27 DIAGNOSIS — I10 ESSENTIAL HYPERTENSION: ICD-10-CM

## 2020-01-27 PROCEDURE — 99214 OFFICE O/P EST MOD 30 MIN: CPT | Performed by: PHYSICIAN ASSISTANT

## 2020-01-27 RX ORDER — LISINOPRIL 10 MG/1
TABLET ORAL
Qty: 90 TAB | Refills: 3 | Status: SHIPPED | OUTPATIENT
Start: 2020-01-27 | End: 2020-05-11

## 2020-01-27 RX ORDER — GLIPIZIDE 5 MG/1
2.5 TABLET ORAL
Qty: 90 TAB | Refills: 1 | Status: SHIPPED | OUTPATIENT
Start: 2020-01-27 | End: 2021-01-18

## 2020-01-27 RX ORDER — SIMVASTATIN 10 MG
TABLET ORAL
Qty: 90 TAB | Refills: 3 | Status: SHIPPED | OUTPATIENT
Start: 2020-01-27 | End: 2021-02-16

## 2020-01-27 SDOH — HEALTH STABILITY: MENTAL HEALTH: HOW OFTEN DO YOU HAVE 6 OR MORE DRINKS ON ONE OCCASION?: NEVER

## 2020-01-27 SDOH — HEALTH STABILITY: MENTAL HEALTH: HOW OFTEN DO YOU HAVE A DRINK CONTAINING ALCOHOL?: NEVER

## 2020-01-27 ASSESSMENT — PATIENT HEALTH QUESTIONNAIRE - PHQ9: CLINICAL INTERPRETATION OF PHQ2 SCORE: 0

## 2020-01-27 NOTE — LETTER
[employer] [facility]  Kasandra Sun P.A.-C.  [address]  [place] [address]  Fax: [phone]   Authorization for Release/Disclosure of   Protected Health Information   Name: JOVON PALMA : 1960 SSN: [id number]   Address: [address] [place] [address] [address]  [place] [address] Phone:    [phone] (home)    I authorize the entity listed below to release/disclose the PHI below to:   [facility]/Kasandra Sun P.A.-C. and Kasandra Sun P.A.-C.   Provider or Entity Name:   [facility]     Address   City, State, [facility]   Phone:      Fax:     Reason for request: continuity of care   Information to be released:    [  ] LAST COLONOSCOPY,  including any PATH REPORT and follow-up  [  ] LAST FIT/COLOGUARD RESULT [  ] LAST DEXA  [  ] LAST MAMMOGRAM  [  ] LAST PAP  [  ] LAST LABS [X] RETINA EXAM REPORT  [  ] IMMUNIZATION RECORDS  [  ] Release all info      [  ] Check here and initial the line next to each item to release ALL health information INCLUDING  _____ Care and treatment for drug and / or alcohol abuse  _____ HIV testing, infection status, or AIDS  _____ Genetic Testing    DATES OF SERVICE OR TIME PERIOD TO BE DISCLOSED: _____________  I understand and acknowledge that:  * This Authorization may be revoked at any time by you in writing, except if your health information has already been used or disclosed.  * Your health information that will be used or disclosed as a result of you signing this authorization could be re-disclosed by the recipient. If this occurs, your re-disclosed health information may no longer be protected by State or Federal laws.  * You may refuse to sign this Authorization. Your refusal will not affect your ability to obtain treatment.  * This Authorization becomes effective upon signing and will  on (date) __________.      If no date is indicated, this Authorization will  one (1) year from the signature date.    Name: Jovon Palma    Signature:   Date:     2020          PLEASE FAX REQUESTED RECORDS BACK TO: [phone]

## 2020-01-28 NOTE — PROGRESS NOTES
Subjective:     Chief Complaint   Patient presents with   • Diabetes     Follow up     Jovon Whatley is a 59 y.o. male here today for evaluation and management of:    DM type 2 with diabetic dyslipidemia (HCC)/ obesity    Stable. Currently taking metformin 1000 mg in the morning and 1500 mg in the evening as directed.  Denies side effects or hypoglycemic events.  Denies side effects--no myalgias or abdominal pain.  He is monitoring blood sugar regularly at home-- around 150 laly  Reports diet has been suffering  He is not exercising regularly.  He is very active at work.  Last eye exam: Within the last 6 months negative for retinopathy  Denies polyuria, polydipsia, blurred vision, or neuropathies.  Heis taking ASA daily.  He denies dizziness, claudication, or chest pain.    Essential hypertension  Stable. Currently taking lisinopril 10 mg daily as directed.   He is taking baby aspirin daily.   He is not monitoring BP at home.   Denies symptoms low BP: light-headed, tunnel-vision, unusual fatigue.   Denies symptoms high BP:pounding headache, visual changes, palpitations, flushed face.   Denies medicine side effects: unusual fatigue, slow heartbeat, foot/leg swelling, cough.    Leukocytosis  No unexplained fever, chills or weight loss.  States he did have a cold in December where he missed 2 days of work.  Still has a lingering cough which seems to be improving.  No chest wall or pain with inspiration or expiration.  No shortness of breath or wheezing.  Cough is dry.  Overall is improving.    ROS   Denies any recent fevers or chills. No nausea or vomiting. No diarrhea. No chest pains or shortness of breath. No lower extremity edema.    No Known Allergies    Current medicines (including changes today)  Current Outpatient Medications   Medication Sig Dispense Refill   • metformin (GLUCOPHAGE) 1000 MG tablet TAKE ONE TABLET BY MOUTH EVERY MORNING AND TAKE 1 AND 1/2 TABLETS EVERY NIGHT AT BEDTIME. TAKE WITH MEALS  "180 Tab 3   • lisinopril (PRINIVIL) 10 MG Tab TAKE ONE TABLET BY MOUTH DAILY 90 Tab 3   • simvastatin (ZOCOR) 10 MG Tab TAKE ONE TABLET BY MOUTH EVERY EVENING 90 Tab 3   • aspirin (ASA) 81 MG Chew Tab chewable tablet Take 1 Tab by mouth every day. 100 Tab    • Multiple Vitamin (MULTI VITAMIN DAILY PO) Take  by mouth.     • Blood Glucose Monitoring Suppl Device Dispense:Glucometer covered by patient's insurance 1 Device 0   • Blood Glucose Monitoring Suppl SUPPLIES Misc Test strips order: Test strips for glucometer covered by patient's insurance. Sig: use once daily and prn ssx high or low sugar. 100 Each 3   • Lancets Misc Lancets order: Lancets for glucometer covered by patient's insurance. Sig: use once daily and prn ssx high or low sugar. 100 Each 3   • Cholecalciferol (VITAMIN D3) 2000 UNITS Tab Take  by mouth.       No current facility-administered medications for this visit.        Patient Active Problem List    Diagnosis Date Noted   • Obesity (BMI 30-39.9) 10/24/2017   • DM type 2 with diabetic dyslipidemia (HCC) 07/15/2015   • Essential hypertension 07/15/2015   • Dyslipidemia 07/15/2015       Family History   Problem Relation Age of Onset   • Other Father         heart valve   • Heart Disease Father    • Cancer Mother         brain cancer   • Diabetes Neg Hx    • Hypertension Neg Hx    • Hyperlipidemia Neg Hx    • Stroke Neg Hx           Objective:     Vitals:    01/27/20 1621   BP: 128/88   BP Location: Left arm   Patient Position: Sitting   BP Cuff Size: Adult   Pulse: 81   Temp: 36.7 °C (98.1 °F)   TempSrc: Temporal   SpO2: 96%   Weight: 100.1 kg (220 lb 9.6 oz)   Height: 1.651 m (5' 5\")     Body mass index is 36.71 kg/m².     Physical Exam:  Gen: Well developed, well nourished in no acute distress.  Obese male.  Skin: Pink, warm, and dry  HEENT: conjunctiva non-injected, sclera non-icteric. EOMs intact.   Neck: Supple, trachea midline. No adenopathy or masses in the neck or supraclavicular " regions.  Lungs: Effort is normal. Clear to auscultation bilaterally with good excursion.  CV: regular rate and rhythm.  Abdomen: soft, nontender, + BS. No HSM.  No CVAT  Ext: no edema, color normal, vascularity normal, temperature normal  Monofilament testing with a 10 gram force: sensation intact: intact bilaterally  Visual Inspection: Feet without maceration, ulcers, fissures.  Pedal pulses: intact bilaterally    Alert and oriented Eye contact is good, speech goal directed, affect calm    Results for orders placed or performed during the hospital encounter of 01/16/20   HEP C VIRUS ANTIBODY   Result Value Ref Range    Hepatitis C Antibody Negative Negative   HEMOGLOBIN A1C   Result Value Ref Range    Glycohemoglobin 7.9 (H) 0.0 - 5.6 %    Est Avg Glucose 180 mg/dL   MICROALBUMIN CREAT RATIO URINE   Result Value Ref Range    Creatinine, Urine 86.60 mg/dL    Microalbumin, Urine Random <0.7 mg/dL    Micro Alb Creat Ratio see below 0 - 30 mg/g   Lipid Profile   Result Value Ref Range    Cholesterol,Tot 131 100 - 199 mg/dL    Triglycerides 188 (H) 0 - 149 mg/dL    HDL 37 (A) >=40 mg/dL    LDL 56 <100 mg/dL   CBC WITH DIFFERENTIAL   Result Value Ref Range    WBC 12.4 (H) 4.8 - 10.8 K/uL    RBC 5.52 4.70 - 6.10 M/uL    Hemoglobin 14.9 14.0 - 18.0 g/dL    Hematocrit 46.8 42.0 - 52.0 %    MCV 84.8 81.4 - 97.8 fL    MCH 27.0 27.0 - 33.0 pg    MCHC 31.8 (L) 33.7 - 35.3 g/dL    RDW 41.1 35.9 - 50.0 fL    Platelet Count 293 164 - 446 K/uL    MPV 11.1 9.0 - 12.9 fL    Neutrophils-Polys 65.70 44.00 - 72.00 %    Lymphocytes 21.90 (L) 22.00 - 41.00 %    Monocytes 9.00 0.00 - 13.40 %    Eosinophils 1.80 0.00 - 6.90 %    Basophils 0.70 0.00 - 1.80 %    Immature Granulocytes 0.90 0.00 - 0.90 %    Nucleated RBC 0.00 /100 WBC    Neutrophils (Absolute) 8.17 (H) 1.82 - 7.42 K/uL    Lymphs (Absolute) 2.72 1.00 - 4.80 K/uL    Monos (Absolute) 1.12 (H) 0.00 - 0.85 K/uL    Eos (Absolute) 0.23 0.00 - 0.51 K/uL    Baso (Absolute) 0.09 0.00 -  0.12 K/uL    Immature Granulocytes (abs) 0.11 0.00 - 0.11 K/uL    NRBC (Absolute) 0.00 K/uL   Comp Metabolic Panel   Result Value Ref Range    Sodium 136 135 - 145 mmol/L    Potassium 4.2 3.6 - 5.5 mmol/L    Chloride 103 96 - 112 mmol/L    Co2 25 20 - 33 mmol/L    Anion Gap 8.0 0.0 - 11.9    Glucose 169 (H) 65 - 99 mg/dL    Bun 14 8 - 22 mg/dL    Creatinine 0.75 0.50 - 1.40 mg/dL    Calcium 9.5 8.5 - 10.5 mg/dL    AST(SGOT) 15 12 - 45 U/L    ALT(SGPT) 29 2 - 50 U/L    Alkaline Phosphatase 79 30 - 99 U/L    Total Bilirubin 0.4 0.1 - 1.5 mg/dL    Albumin 4.5 3.2 - 4.9 g/dL    Total Protein 7.3 6.0 - 8.2 g/dL    Globulin 2.8 1.9 - 3.5 g/dL    A-G Ratio 1.6 g/dL   FASTING STATUS   Result Value Ref Range    Fasting Status Fasting    ESTIMATED GFR   Result Value Ref Range    GFR If African American >60 >60 mL/min/1.73 m 2    GFR If Non African American >60 >60 mL/min/1.73 m 2     Reviewed and discussed above labs with patient in office.      Assessment and Plan:   The following treatment plan was discussed:     1. DM type 2 with diabetic dyslipidemia (HCC)  Worsened.  Add 2.5 mg of glipizide daily with food. We will recheck A1c in 3 months at [facility]  - metformin (GLUCOPHAGE) 1000 MG tablet; TAKE ONE TABLET BY MOUTH EVERY MORNING AND TAKE 1 AND 1/2 TABLETS EVERY NIGHT AT BEDTIME. TAKE WITH MEALS  Dispense: 225 Tab; Refill: 3  - glipiZIDE (GLUCOTROL) 5 MG Tab; Take 0.5 Tabs by mouth every morning with breakfast.  Dispense: 90 Tab; Refill: 1    2. Obesity (BMI 30-39.9)  Stressed importance of diet modification and exercise.  - Patient identified as having weight management issue.  Appropriate orders and counseling given.    3. Essential hypertension  -Stable. Continue current medicines. Monitor labs regularly.   - lisinopril (PRINIVIL) 10 MG Tab; TAKE ONE TABLET BY MOUTH DAILY  Dispense: 90 Tab; Refill: 3    4. Leukocytosis, unspecified type  White blood cell trending upwards each time it is checked every 6 months since 2017.   We will continue to monitor recheck in at least 1 month from any illness when he is feeling well.  - CBC WITH DIFFERENTIAL; Future    -Any change or worsening of signs or symptoms, patient encouraged to follow-up or report to emergency room for further evaluation. Patient verbalizes understanding and agrees.      Followup: Return in about 3 months (around 4/27/2020) for follow-up on DM.         This dictation was created using voice recognition software. The accuracy of the dictation is limited to the abilities of the software. I expect there may be some errors of grammar and possibly content.

## 2020-02-18 ENCOUNTER — HOSPITAL ENCOUNTER (OUTPATIENT)
Dept: LAB | Facility: MEDICAL CENTER | Age: 60
End: 2020-02-18
Attending: PHYSICIAN ASSISTANT
Payer: COMMERCIAL

## 2020-02-18 DIAGNOSIS — D72.829 LEUKOCYTOSIS, UNSPECIFIED TYPE: ICD-10-CM

## 2020-02-18 LAB
BASOPHILS # BLD AUTO: 0.8 % (ref 0–1.8)
BASOPHILS # BLD: 0.11 K/UL (ref 0–0.12)
EOSINOPHIL # BLD AUTO: 0.23 K/UL (ref 0–0.51)
EOSINOPHIL NFR BLD: 1.6 % (ref 0–6.9)
ERYTHROCYTE [DISTWIDTH] IN BLOOD BY AUTOMATED COUNT: 41.1 FL (ref 35.9–50)
HCT VFR BLD AUTO: 45.2 % (ref 42–52)
HGB BLD-MCNC: 14.8 G/DL (ref 14–18)
IMM GRANULOCYTES # BLD AUTO: 0.13 K/UL (ref 0–0.11)
IMM GRANULOCYTES NFR BLD AUTO: 0.9 % (ref 0–0.9)
LYMPHOCYTES # BLD AUTO: 3.37 K/UL (ref 1–4.8)
LYMPHOCYTES NFR BLD: 23.6 % (ref 22–41)
MCH RBC QN AUTO: 27.7 PG (ref 27–33)
MCHC RBC AUTO-ENTMCNC: 32.7 G/DL (ref 33.7–35.3)
MCV RBC AUTO: 84.5 FL (ref 81.4–97.8)
MONOCYTES # BLD AUTO: 1.37 K/UL (ref 0–0.85)
MONOCYTES NFR BLD AUTO: 9.6 % (ref 0–13.4)
NEUTROPHILS # BLD AUTO: 9.04 K/UL (ref 1.82–7.42)
NEUTROPHILS NFR BLD: 63.5 % (ref 44–72)
NRBC # BLD AUTO: 0 K/UL
NRBC BLD-RTO: 0 /100 WBC
PLATELET # BLD AUTO: 271 K/UL (ref 164–446)
PMV BLD AUTO: 10.4 FL (ref 9–12.9)
RBC # BLD AUTO: 5.35 M/UL (ref 4.7–6.1)
WBC # BLD AUTO: 14.3 K/UL (ref 4.8–10.8)

## 2020-02-18 PROCEDURE — 36415 COLL VENOUS BLD VENIPUNCTURE: CPT

## 2020-02-18 PROCEDURE — 85025 COMPLETE CBC W/AUTO DIFF WBC: CPT

## 2020-02-25 ENCOUNTER — TELEPHONE (OUTPATIENT)
Dept: MEDICAL GROUP | Age: 60
End: 2020-02-25

## 2020-02-25 DIAGNOSIS — D72.829 LEUKOCYTOSIS, UNSPECIFIED TYPE: ICD-10-CM

## 2020-02-25 NOTE — TELEPHONE ENCOUNTER
Phone Number Called: [phone]    Call outcome: Spoke to patient regarding message below.    Message: Patient understands and would like to have the labs ordered. He also stated that he has not felt sick or had any fever, chills or weight loss. Patient feels well.

## 2020-02-25 NOTE — TELEPHONE ENCOUNTER
Please call and let patient know that his white blood cell count was a little higher than last time. I'd like him to repeat the test one more time in about a week (assuming he feels well).    Has he had any unexplained fever, chills or weight loss?  Has he been sick recently?

## 2020-02-25 NOTE — TELEPHONE ENCOUNTER
Phone Number Called: [phone]    Call outcome: Did not leave a detailed message. Requested patient to call back.    Message: 1st attempt to discuss message below.

## 2020-03-06 ENCOUNTER — HOSPITAL ENCOUNTER (OUTPATIENT)
Dept: LAB | Facility: MEDICAL CENTER | Age: 60
End: 2020-03-06
Attending: PHYSICIAN ASSISTANT
Payer: COMMERCIAL

## 2020-03-06 DIAGNOSIS — D72.829 LEUKOCYTOSIS, UNSPECIFIED TYPE: ICD-10-CM

## 2020-03-06 LAB
BASOPHILS # BLD AUTO: 0.7 % (ref 0–1.8)
BASOPHILS # BLD: 0.07 K/UL (ref 0–0.12)
EOSINOPHIL # BLD AUTO: 0.2 K/UL (ref 0–0.51)
EOSINOPHIL NFR BLD: 1.9 % (ref 0–6.9)
ERYTHROCYTE [DISTWIDTH] IN BLOOD BY AUTOMATED COUNT: 41.6 FL (ref 35.9–50)
HCT VFR BLD AUTO: 46.5 % (ref 42–52)
HGB BLD-MCNC: 14.7 G/DL (ref 14–18)
IMM GRANULOCYTES # BLD AUTO: 0.08 K/UL (ref 0–0.11)
IMM GRANULOCYTES NFR BLD AUTO: 0.7 % (ref 0–0.9)
LYMPHOCYTES # BLD AUTO: 2.53 K/UL (ref 1–4.8)
LYMPHOCYTES NFR BLD: 23.6 % (ref 22–41)
MCH RBC QN AUTO: 27.1 PG (ref 27–33)
MCHC RBC AUTO-ENTMCNC: 31.6 G/DL (ref 33.7–35.3)
MCV RBC AUTO: 85.6 FL (ref 81.4–97.8)
MONOCYTES # BLD AUTO: 0.98 K/UL (ref 0–0.85)
MONOCYTES NFR BLD AUTO: 9.2 % (ref 0–13.4)
NEUTROPHILS # BLD AUTO: 6.84 K/UL (ref 1.82–7.42)
NEUTROPHILS NFR BLD: 63.9 % (ref 44–72)
NRBC # BLD AUTO: 0 K/UL
NRBC BLD-RTO: 0 /100 WBC
PLATELET # BLD AUTO: 264 K/UL (ref 164–446)
PMV BLD AUTO: 10.9 FL (ref 9–12.9)
RBC # BLD AUTO: 5.43 M/UL (ref 4.7–6.1)
WBC # BLD AUTO: 10.7 K/UL (ref 4.8–10.8)

## 2020-03-06 PROCEDURE — 36415 COLL VENOUS BLD VENIPUNCTURE: CPT

## 2020-03-06 PROCEDURE — 85025 COMPLETE CBC W/AUTO DIFF WBC: CPT

## 2020-03-17 ENCOUNTER — TELEPHONE (OUTPATIENT)
Dept: MEDICAL GROUP | Age: 60
End: 2020-03-17

## 2020-03-17 DIAGNOSIS — R06.2 WHEEZING: ICD-10-CM

## 2020-03-17 DIAGNOSIS — R05.3 CHRONIC COUGH: ICD-10-CM

## 2020-03-17 NOTE — TELEPHONE ENCOUNTER
Phone Number Called: [phone] (home)     Call outcome: Spoke to patient regarding message below.    Message: Patient states that he has no other symptoms, just a cough that is getting increasingly worse. Patient informed that antibiotics are typically not prescribed unless he comes in for a visit. He states that you know about this cough. He has an appointment next month with you but would like some help now.     I informed the pt that I would send you a message, but he may have to come in for an appointment and be seen by another provider in our office.      Okay to leave a detailed message when calling back.

## 2020-03-17 NOTE — TELEPHONE ENCOUNTER
VOICEMAIL  1. Caller Name: Jovon Whatley                      Call Back Number: [phone] (home)     2. Message: Patient left a voicemail stating that he has had a cough since last November. He is requesting that you prescribe him an antibiotic or cough medicine to help suppress the cough.    3. Patient approves office to leave a detailed voicemail/MyChart message: no

## 2020-03-23 RX ORDER — ALBUTEROL SULFATE 90 UG/1
2 AEROSOL, METERED RESPIRATORY (INHALATION) EVERY 4 HOURS PRN
Qty: 1 INHALER | Refills: 0 | Status: SHIPPED | OUTPATIENT
Start: 2020-03-23 | End: 2020-06-29

## 2020-03-23 NOTE — TELEPHONE ENCOUNTER
Spoke with pt. Cough sounds a little tight. He says it worsened about a week ago and took some time off of work.  No fever, chills, body aches. No ear or throat pain. Nose is runny.   Denies seasonal allergies though his cough was present in the fall and improved during the winter.    Plan:  Will start with albuterol for now. Q4-6 hours shortness of breath or cough.  No improvement or needing every day will send in steroid inhaler. Would like to do PFT when covid-19 season slows down.

## 2020-03-26 RX ORDER — FLUTICASONE PROPIONATE 44 MCG
2 AEROSOL WITH ADAPTER (GRAM) INHALATION 2 TIMES DAILY
Qty: 1 INHALER | Refills: 1 | Status: SHIPPED | OUTPATIENT
Start: 2020-03-26 | End: 2020-05-20

## 2020-03-26 NOTE — TELEPHONE ENCOUNTER
Called and spoke with patient. He has been using Albuterol HFA at least twice a day. States it helps, but as we were on phone he was coughing with audible wheeze.  Sending over Rx of Flovent 44 mcg to use two puffs once daily to start--if still needing albuterol once or twice a day, he will increase to twice daily.     We will get him in for PFT in future.   Will check back on him next week. He will call if he has any questions or concerns.

## 2020-04-01 NOTE — TELEPHONE ENCOUNTER
Called and checked in on patient. Cough has improved but not fully resolved. Experiencing a little nausea, unsure if it is related to inhaler. He is rinsing mouth out after using inhaler. He is using 2 puffs BID-- forgot instructions on call so went with Rx sig.     Plan: Continue with 2 puffs BID x 1 more week. Will check back in with patient in about 1 week.

## 2020-04-08 NOTE — TELEPHONE ENCOUNTER
"Phone Number Called: [phone] (home)     Call outcome: Spoke to patient regarding message below.    Message: Patient states that he still has a cough. The steroid inhaler suppressed it a little, but it is not going away. Still experiencing Nausea and having diarrhea. Has not been using the albuterol, he states that he does not feel the need to. Cough is getting better but still \"annoying\" him.   "

## 2020-04-08 NOTE — TELEPHONE ENCOUNTER
Discussed with patient last week if continues, will order PFT for further evaluation. Ordered today. Advised to call [phone] to schedule.

## 2020-05-08 ENCOUNTER — TELEPHONE (OUTPATIENT)
Dept: HEALTH INFORMATION MANAGEMENT | Facility: OTHER | Age: 60
End: 2020-05-08

## 2020-05-08 NOTE — TELEPHONE ENCOUNTER
1. Caller Name: Jovon Whatley                        Call Back Number: [phone]  [facility] PCP or Specialty Provider: Yes Kasandra May        2.  Does patient have any active symptoms of respiratory illness? Yes, the patient reports the following respiratory symptoms: cough.  States has had this cough since November.  Has had a few phone visits since November.     3.  Does patient have any comoribidities? DM and Immunosuppressed  Patient has been talking steroid inhaler twice a day which is not helping.    4.  Has the patient traveled in the last 14 days OR had any known contact with someone who is suspected or confirmed to have COVID-19?  No.    5. Disposition: Cleared by RN Triage as potential is low for COVID-19; OK to keep/schedule appointment  Consulted with Nasima gonzalez and patient has been cleared for in office visit with PCP.  Note routed to [facility] Provider: [facility] only.

## 2020-05-11 ENCOUNTER — OFFICE VISIT (OUTPATIENT)
Dept: MEDICAL GROUP | Age: 60
End: 2020-05-11
Payer: COMMERCIAL

## 2020-05-11 VITALS
HEART RATE: 84 BPM | WEIGHT: 221.2 LBS | TEMPERATURE: 98.2 F | OXYGEN SATURATION: 96 % | BODY MASS INDEX: 35.55 KG/M2 | DIASTOLIC BLOOD PRESSURE: 84 MMHG | HEIGHT: 66 IN | SYSTOLIC BLOOD PRESSURE: 130 MMHG

## 2020-05-11 DIAGNOSIS — E78.5 DM TYPE 2 WITH DIABETIC DYSLIPIDEMIA (HCC): ICD-10-CM

## 2020-05-11 DIAGNOSIS — E11.69 DM TYPE 2 WITH DIABETIC DYSLIPIDEMIA (HCC): ICD-10-CM

## 2020-05-11 DIAGNOSIS — E66.9 OBESITY (BMI 30-39.9): ICD-10-CM

## 2020-05-11 DIAGNOSIS — I10 ESSENTIAL HYPERTENSION: ICD-10-CM

## 2020-05-11 DIAGNOSIS — E55.9 HYPOVITAMINOSIS D: ICD-10-CM

## 2020-05-11 DIAGNOSIS — R53.83 FATIGUE, UNSPECIFIED TYPE: ICD-10-CM

## 2020-05-11 DIAGNOSIS — E78.5 DYSLIPIDEMIA: ICD-10-CM

## 2020-05-11 DIAGNOSIS — R05.9 COUGH: ICD-10-CM

## 2020-05-11 PROCEDURE — 99214 OFFICE O/P EST MOD 30 MIN: CPT | Performed by: INTERNAL MEDICINE

## 2020-05-11 RX ORDER — LOSARTAN POTASSIUM 50 MG/1
50 TABLET ORAL DAILY
Qty: 90 TAB | Refills: 0 | Status: SHIPPED | OUTPATIENT
Start: 2020-05-11 | End: 2020-06-29 | Stop reason: SDUPTHER

## 2020-05-11 ASSESSMENT — FIBROSIS 4 INDEX: FIB4 SCORE: 0.6

## 2020-05-11 NOTE — PROGRESS NOTES
CHIEF COMPLIANT:   Cough    Jovon Whatley is a 59 y.o. male here for DM follow up    DM 2  Onset/Dg: ~ 53 y/o  Diabetes education:  Had nutrition class    Medications:   · Metformin:  1000 mg + 1500 mg  · Glipizide 2.5 mg in AMs  · ACE/ARB: losartan  · Statin: simvastatin  · ASA: yes  Compliant with medications: yes  Checking feet daily/wear soft socks/shoes: advised    Diabetes ABCDE TARGETS  · A1c, last:   7.9, previous 7.2  · Fingersticks:  3 d/w  · BS:  120-150; mostly > 145  · Blood Pressure, goal < 140/90: yes  · Cholesterol-Lipid Panel:   · Dysalbuminuria:      Diet: regular  Exercise:  walks at work daily  BMI: 35    DM complications:  · Peripheral neuropathy: No numbness or tingling sensation in the feet.  · Retinopathy:      Last eye exam: within1 year . No retinopathy.    · Nephropathy:     No  · CVS:     No CAD.  · GI:     No gastropathy.    FH of DM: neg    Hypertension, cough  Meds: lisinopril 10 mg QD   -Complains of dry cough since December 2019, almost 6 months    Denies:  -  headaches, vision problems, tinnitus.                 -  chest pain/pressure, palpitations, irregular heart beats, exertional, dyspnea, peripheral edema.  Low salt diet: advised  Diet / exercise / BMI: as above.   FH of HTN: neg    Imaging  Chest x-ray, 7/7/2014  1.  Mild hypoinflation with linear bibasilar atelectasis.    Dyslipidemia  Statin: Simvastatin, 10 mg daily, taking as prescribed.   - No muscle weakness, cramps, nausea,abdominal discomfort.   Diet / exercise / BMI: as above.   FH: neg     Hypovitaminosis D, fatigue  The patient had low vitamin D level.    Vitamin D supplement: multivit    Body mass index is 35.7 kg/m².  Onset: mid 40'  Diet: Regular  Exercise: Insufficient  No temperature intolerance. No change in hair/skin quality, BMs.   No HTN, buffalo hump, purple striae, flushing.  FH of obesity: sister    Reviewed PMH, PSH, FH, SH, ALL, IMM, MEDS.     Current medicines (including changes today)  Current  Outpatient Medications   Medication Sig Dispense Refill   • fluticasone (FLOVENT HFA) 44 MCG/ACT Aerosol Inhale 2 Puffs by mouth 2 times a day. Rinse mouth after use 1 Inhaler 1   • albuterol 108 (90 Base) MCG/ACT Aero Soln inhalation aerosol Inhale 2 Puffs by mouth every four hours as needed for Shortness of Breath. 1 Inhaler 0   • metformin (GLUCOPHAGE) 1000 MG tablet TAKE ONE TABLET BY MOUTH EVERY MORNING AND TAKE 1 AND 1/2 TABLETS EVERY NIGHT AT BEDTIME. TAKE WITH MEALS 225 Tab 3   • glipiZIDE (GLUCOTROL) 5 MG Tab Take 0.5 Tabs by mouth every morning with breakfast. 90 Tab 1   • lisinopril (PRINIVIL) 10 MG Tab TAKE ONE TABLET BY MOUTH DAILY 90 Tab 3   • simvastatin (ZOCOR) 10 MG Tab TAKE ONE TABLET BY MOUTH EVERY EVENING 90 Tab 3   • aspirin (ASA) 81 MG Chew Tab chewable tablet Take 1 Tab by mouth every day. 100 Tab    • Multiple Vitamin (MULTI VITAMIN DAILY PO) Take  by mouth.     • Blood Glucose Monitoring Suppl Device Dispense:Glucometer covered by patient's insurance 1 Device 0   • Blood Glucose Monitoring Suppl SUPPLIES Misc Test strips order: Test strips for glucometer covered by patient's insurance. Sig: use once daily and prn ssx high or low sugar. 100 Each 3   • Lancets Misc Lancets order: Lancets for glucometer covered by patient's insurance. Sig: use once daily and prn ssx high or low sugar. 100 Each 3   • Cholecalciferol (VITAMIN D3) 2000 UNITS Tab Take  by mouth.       No current facility-administered medications for this visit.      Allergies: Patient has no known allergies.  He  has a past medical history of Diabetes (HCC), Hyperlipidemia, and Hypertension.  He  has a past surgical history that includes ankle orif (Right) and appendectomy.  Social History     Tobacco Use   • Smoking status: Never Smoker   • Smokeless tobacco: Never Used   Substance Use Topics   • Alcohol use: No     Frequency: Never     Binge frequency: Never   • Drug use: No     Social History     Social History Narrative   • Not  "on file     Family History   Problem Relation Age of Onset   • Other Father         heart valve   • Heart Disease Father    • Cancer Mother         brain cancer   • Diabetes Neg Hx    • Hypertension Neg Hx    • Hyperlipidemia Neg Hx    • Stroke Neg Hx      Family Status   Relation Name Status   • Fa     • Mo     • Sis  Alive   • Bro  Alive   • Sis  Alive   • Neg Hx  (Not Specified)     ROS   Constitutional: Negative for fever, chills and weight loss.   HEENT: Negative for blurred vision, sore throat, swollen glands. No hearing loss or vertigo.  Respiratory: Negative for cough, wheezing, shortness of breath.   CVS: Negative for chest pain, palpitations, irregular heart beats, exertional dyspnea.   GI: Negative for heartburn, abdominal pain, nausea, vomiting, change in BMs.   : Negative for dysuria, polyuria.   MS: Negative for myalgias, joint pain.   Neuro: no headaches, numbness, weakness, seizures.   Skin: no skin lesions, rash.  Endocrine: per HPI.     PHYSICAL EXAM   Blood Pressure 130/84 (BP Location: Left arm, Patient Position: Sitting)   Pulse 84   Temperature 36.8 °C (98.2 °F) (Temporal)   Height 1.676 m (5' 6\")   Weight 100.3 kg (221 lb 3.2 oz)   Oxygen Saturation 96%   Body Mass Index 35.70 kg/m²   Alert, oriented in no acute distress.  Eye contact is good, speech goal directed, affect bright.  HEENT: EOMI, PERRL, conjunctiva non-injected, sclera non-icteric.  Neck No visible LAD, thyromegaly.  Lungs: Unlabored breathing, CTA B/L  CV: regular rate and rhythm, without murmur, rubs, thrills, or gallops.  Abdomen: soft, non-distended, non-tender with normal bowel sounds. No CVAT.  Lower extremities: color normal, vascularity normal, no edema, temperature normal  Musculoskeletal: Normal gait. No obvious muscle weakness or wasting.  Psych: Normal mood and affect. Alert and oriented x3. Judgment and insight is normal.  Neuro: AAO x 3. CN 2-12 intact. No focal deficits.    LABS     Results " reviewed and discussed with the patient, questions answered.    Last labs:  Lab Results   Component Value Date/Time    CHOLSTRLTOT 131 01/16/2020 08:49 AM    LDL 56 01/16/2020 08:49 AM    HDL 37 (A) 01/16/2020 08:49 AM    TRIGLYCERIDE 188 (H) 01/16/2020 08:49 AM       Lab Results   Component Value Date/Time    SODIUM 136 01/16/2020 08:49 AM    POTASSIUM 4.2 01/16/2020 08:49 AM    CHLORIDE 103 01/16/2020 08:49 AM    CO2 25 01/16/2020 08:49 AM    GLUCOSE 169 (H) 01/16/2020 08:49 AM    BUN 14 01/16/2020 08:49 AM    CREATININE 0.75 01/16/2020 08:49 AM     Lab Results   Component Value Date/Time    ALKPHOSPHAT 79 01/16/2020 08:49 AM    ASTSGOT 15 01/16/2020 08:49 AM    ALTSGPT 29 01/16/2020 08:49 AM    TBILIRUBIN 0.4 01/16/2020 08:49 AM      Lab Results   Component Value Date/Time    HBA1C 7.9 (H) 01/16/2020 08:49 AM    HBA1C 7.2 (A) 07/29/2019 10:35 AM    HBA1C 7.2 (H) 01/17/2019 07:43 AM     No results found for: TSH  No results found for: FREET4    Lab Results   Component Value Date/Time    WBC 10.7 03/06/2020 10:03 AM    RBC 5.43 03/06/2020 10:03 AM    HEMOGLOBIN 14.7 03/06/2020 10:03 AM    HEMATOCRIT 46.5 03/06/2020 10:03 AM    MCV 85.6 03/06/2020 10:03 AM    MCH 27.1 03/06/2020 10:03 AM    MCHC 31.6 (L) 03/06/2020 10:03 AM    MPV 10.9 03/06/2020 10:03 AM    NEUTSPOLYS 63.90 03/06/2020 10:03 AM    LYMPHOCYTES 23.60 03/06/2020 10:03 AM    MONOCYTES 9.20 03/06/2020 10:03 AM    EOSINOPHILS 1.90 03/06/2020 10:03 AM    BASOPHILS 0.70 03/06/2020 10:03 AM      ASSESMENT AND PLAN     1. DM type 2 with diabetic dyslipidemia (HCC)    Discussed about:    - importance of appropriate diet, exercise, WT control.   - hypoglycemic symptoms and precautions.    - long-term complications of uncontrolled DM, (increased risk of CAD, strokes, retinopathy, nephropathy, /can lead to kidney failure, dialysis/, peripheral neuropathy, (can lead to amputation)   - Good DM control can prevent / delay many of these complications.    -  Recommended appropriate foot care     - Comp Metabolic Panel; Future  - HEMOGLOBIN A1C; Future  - MICROALBUMIN CREAT RATIO URINE; Future    2. Essential hypertension  Controlled, continue current treatment  - DX-CHEST-2 VIEWS; Future  - Comp Metabolic Panel; Future    3. Cough  Switched from lisinopril to losartan  - DX-CHEST-2 VIEWS; Future  - CBC WITH DIFFERENTIAL; Future    4. Dyslipidemia  Pending labs, continue current treatment  - Comp Metabolic Panel; Future  - Lipid Profile; Future    5. Hypovitaminosis D  Pending labs  - VITAMIN D,25 HYDROXY; Future    6. Fatigue, unspecified type  Pending labs  - TSH; Future  - CBC WITH DIFFERENTIAL; Future    7. Obesity (BMI 30-39.9)  Appropriate counseling given    All questions are answered.    Smoking Counseling: Nonsmoker    Followup: Within 1 month, with PCP and labs

## 2020-05-20 RX ORDER — FLUTICASONE PROPIONATE 44 MCG
2 AEROSOL WITH ADAPTER (GRAM) INHALATION 2 TIMES DAILY
Qty: 30 INHALER | Refills: 1 | Status: SHIPPED | OUTPATIENT
Start: 2020-05-20 | End: 2020-06-29

## 2020-06-11 ENCOUNTER — HOSPITAL ENCOUNTER (OUTPATIENT)
Dept: LAB | Facility: MEDICAL CENTER | Age: 60
End: 2020-06-11
Attending: INTERNAL MEDICINE
Payer: COMMERCIAL

## 2020-06-11 DIAGNOSIS — E78.5 DM TYPE 2 WITH DIABETIC DYSLIPIDEMIA (HCC): ICD-10-CM

## 2020-06-11 DIAGNOSIS — I10 ESSENTIAL HYPERTENSION: ICD-10-CM

## 2020-06-11 DIAGNOSIS — E78.5 DYSLIPIDEMIA: ICD-10-CM

## 2020-06-11 DIAGNOSIS — R05.9 COUGH: ICD-10-CM

## 2020-06-11 DIAGNOSIS — E55.9 HYPOVITAMINOSIS D: ICD-10-CM

## 2020-06-11 DIAGNOSIS — E11.69 DM TYPE 2 WITH DIABETIC DYSLIPIDEMIA (HCC): ICD-10-CM

## 2020-06-11 DIAGNOSIS — R53.83 FATIGUE, UNSPECIFIED TYPE: ICD-10-CM

## 2020-06-11 LAB
25(OH)D3 SERPL-MCNC: 28 NG/ML (ref 30–100)
ALBUMIN SERPL BCP-MCNC: 4.4 G/DL (ref 3.2–4.9)
ALBUMIN/GLOB SERPL: 1.6 G/DL
ALP SERPL-CCNC: 78 U/L (ref 30–99)
ALT SERPL-CCNC: 35 U/L (ref 2–50)
ANION GAP SERPL CALC-SCNC: 12 MMOL/L (ref 7–16)
AST SERPL-CCNC: 18 U/L (ref 12–45)
BASOPHILS # BLD AUTO: 0.6 % (ref 0–1.8)
BASOPHILS # BLD: 0.06 K/UL (ref 0–0.12)
BILIRUB SERPL-MCNC: 0.4 MG/DL (ref 0.1–1.5)
BUN SERPL-MCNC: 14 MG/DL (ref 8–22)
CALCIUM SERPL-MCNC: 9 MG/DL (ref 8.5–10.5)
CHLORIDE SERPL-SCNC: 104 MMOL/L (ref 96–112)
CHOLEST SERPL-MCNC: 122 MG/DL (ref 100–199)
CO2 SERPL-SCNC: 22 MMOL/L (ref 20–33)
CREAT SERPL-MCNC: 0.71 MG/DL (ref 0.5–1.4)
CREAT UR-MCNC: 219.53 MG/DL
EOSINOPHIL # BLD AUTO: 0.17 K/UL (ref 0–0.51)
EOSINOPHIL NFR BLD: 1.6 % (ref 0–6.9)
ERYTHROCYTE [DISTWIDTH] IN BLOOD BY AUTOMATED COUNT: 42.7 FL (ref 35.9–50)
EST. AVERAGE GLUCOSE BLD GHB EST-MCNC: 180 MG/DL
FASTING STATUS PATIENT QL REPORTED: NORMAL
GLOBULIN SER CALC-MCNC: 2.7 G/DL (ref 1.9–3.5)
GLUCOSE SERPL-MCNC: 157 MG/DL (ref 65–99)
HBA1C MFR BLD: 7.9 % (ref 0–5.6)
HCT VFR BLD AUTO: 46.6 % (ref 42–52)
HDLC SERPL-MCNC: 32 MG/DL
HGB BLD-MCNC: 14.8 G/DL (ref 14–18)
IMM GRANULOCYTES # BLD AUTO: 0.05 K/UL (ref 0–0.11)
IMM GRANULOCYTES NFR BLD AUTO: 0.5 % (ref 0–0.9)
LDLC SERPL CALC-MCNC: 60 MG/DL
LYMPHOCYTES # BLD AUTO: 2.34 K/UL (ref 1–4.8)
LYMPHOCYTES NFR BLD: 21.9 % (ref 22–41)
MCH RBC QN AUTO: 27.1 PG (ref 27–33)
MCHC RBC AUTO-ENTMCNC: 31.8 G/DL (ref 33.7–35.3)
MCV RBC AUTO: 85.2 FL (ref 81.4–97.8)
MICROALBUMIN UR-MCNC: <1.2 MG/DL
MICROALBUMIN/CREAT UR: NORMAL MG/G (ref 0–30)
MONOCYTES # BLD AUTO: 0.97 K/UL (ref 0–0.85)
MONOCYTES NFR BLD AUTO: 9.1 % (ref 0–13.4)
NEUTROPHILS # BLD AUTO: 7.08 K/UL (ref 1.82–7.42)
NEUTROPHILS NFR BLD: 66.3 % (ref 44–72)
NRBC # BLD AUTO: 0 K/UL
NRBC BLD-RTO: 0 /100 WBC
PLATELET # BLD AUTO: 259 K/UL (ref 164–446)
PMV BLD AUTO: 10.7 FL (ref 9–12.9)
POTASSIUM SERPL-SCNC: 4.1 MMOL/L (ref 3.6–5.5)
PROT SERPL-MCNC: 7.1 G/DL (ref 6–8.2)
RBC # BLD AUTO: 5.47 M/UL (ref 4.7–6.1)
SODIUM SERPL-SCNC: 138 MMOL/L (ref 135–145)
TRIGL SERPL-MCNC: 151 MG/DL (ref 0–149)
TSH SERPL DL<=0.005 MIU/L-ACNC: 1.14 UIU/ML (ref 0.38–5.33)
WBC # BLD AUTO: 10.7 K/UL (ref 4.8–10.8)

## 2020-06-11 PROCEDURE — 85025 COMPLETE CBC W/AUTO DIFF WBC: CPT

## 2020-06-11 PROCEDURE — 80061 LIPID PANEL: CPT

## 2020-06-11 PROCEDURE — 84443 ASSAY THYROID STIM HORMONE: CPT

## 2020-06-11 PROCEDURE — 83036 HEMOGLOBIN GLYCOSYLATED A1C: CPT

## 2020-06-11 PROCEDURE — 82043 UR ALBUMIN QUANTITATIVE: CPT

## 2020-06-11 PROCEDURE — 82570 ASSAY OF URINE CREATININE: CPT

## 2020-06-11 PROCEDURE — 82306 VITAMIN D 25 HYDROXY: CPT

## 2020-06-11 PROCEDURE — 80053 COMPREHEN METABOLIC PANEL: CPT

## 2020-06-11 PROCEDURE — 36415 COLL VENOUS BLD VENIPUNCTURE: CPT

## 2020-06-29 ENCOUNTER — OFFICE VISIT (OUTPATIENT)
Dept: MEDICAL GROUP | Age: 60
End: 2020-06-29
Payer: COMMERCIAL

## 2020-06-29 VITALS
TEMPERATURE: 97.3 F | DIASTOLIC BLOOD PRESSURE: 78 MMHG | SYSTOLIC BLOOD PRESSURE: 132 MMHG | HEIGHT: 66 IN | HEART RATE: 81 BPM | WEIGHT: 216.6 LBS | BODY MASS INDEX: 34.81 KG/M2

## 2020-06-29 DIAGNOSIS — E66.9 OBESITY (BMI 30-39.9): ICD-10-CM

## 2020-06-29 DIAGNOSIS — Z12.5 SCREENING FOR PROSTATE CANCER: ICD-10-CM

## 2020-06-29 DIAGNOSIS — R05.3 CHRONIC COUGH: ICD-10-CM

## 2020-06-29 DIAGNOSIS — E78.5 DM TYPE 2 WITH DIABETIC DYSLIPIDEMIA (HCC): ICD-10-CM

## 2020-06-29 DIAGNOSIS — E11.69 DM TYPE 2 WITH DIABETIC DYSLIPIDEMIA (HCC): ICD-10-CM

## 2020-06-29 DIAGNOSIS — I10 ESSENTIAL HYPERTENSION: ICD-10-CM

## 2020-06-29 PROCEDURE — 99214 OFFICE O/P EST MOD 30 MIN: CPT | Performed by: PHYSICIAN ASSISTANT

## 2020-06-29 RX ORDER — LOSARTAN POTASSIUM 50 MG/1
75 TABLET ORAL DAILY
Qty: 90 TAB | Refills: 3 | Status: SHIPPED | OUTPATIENT
Start: 2020-06-29 | End: 2021-01-18 | Stop reason: SDUPTHER

## 2020-06-29 ASSESSMENT — FIBROSIS 4 INDEX: FIB4 SCORE: 0.67

## 2020-06-30 PROBLEM — D72.829 LEUKOCYTOSIS: Status: RESOLVED | Noted: 2020-01-27 | Resolved: 2020-06-30

## 2020-06-30 NOTE — PROGRESS NOTES
Subjective:     Chief Complaint   Patient presents with   • Diabetes     follow up   • Hypertension     labs     Jovon Whatley is a 59 y.o. male here today for evaluation and management of:    DM type 2 with diabetic dyslipidemia/ obesity   Stable. Currently taking Metformin 1000 mg in AM and 1500 mg in pm, glipizide 2.5 mg daily, simvastatin 10 mg daily with 81 of ASA as directed.  Denies side effects or hypoglycemic events.  Denies side effects--no myalgias or abdominal pain.  He is not monitoring blood sugar regularly at home.  Reports diet is improving--cooking more at home.  Now living with his son's grandfather (late wife's father).  He is not exercising regularly, however, reports he has a very physical job..  Last eye exam: 2019- no retinopathy.  Denies polyuria, polydipsia, blurred vision, or neuropathies.  Heis taking ASA daily.  He denies dizziness, claudication, or chest pain.     Essential hypertension  Stable. Currently taking losartan 50 mg daily  as directed.   He is taking baby aspirin daily.   He is not monitoring BP at home.   Denies symptoms low BP: light-headed, tunnel-vision, unusual fatigue.   Denies symptoms high BP:pounding headache, visual changes, palpitations, flushed face.   Denies medicine side effects: unusual fatigue, slow heartbeat, foot/leg swelling, cough.      Chronic cough  Now resolved with cessation of lisinopril and replaced with losartan.      ROS   Denies any recent fevers or chills. No nausea or vomiting. No diarrhea. No chest pains or shortness of breath. No lower extremity edema.    Allergies   Allergen Reactions   • Ace Inhibitors Cough       Current medicines (including changes today)  Current Outpatient Medications   Medication Sig Dispense Refill   • losartan (COZAAR) 50 MG Tab Take 1.5 Tabs by mouth every day. 90 Tab 3   • metformin (GLUCOPHAGE) 1000 MG tablet TAKE ONE TABLET BY MOUTH EVERY MORNING AND TAKE 1 AND 1/2 TABLETS EVERY NIGHT AT BEDTIME. TAKE WITH  "MEALS 225 Tab 3   • glipiZIDE (GLUCOTROL) 5 MG Tab Take 0.5 Tabs by mouth every morning with breakfast. 90 Tab 1   • simvastatin (ZOCOR) 10 MG Tab TAKE ONE TABLET BY MOUTH EVERY EVENING 90 Tab 3   • aspirin (ASA) 81 MG Chew Tab chewable tablet Take 1 Tab by mouth every day. 100 Tab    • Multiple Vitamin (MULTI VITAMIN DAILY PO) Take  by mouth.     • Blood Glucose Monitoring Suppl Device Dispense:Glucometer covered by patient's insurance 1 Device 0   • Blood Glucose Monitoring Suppl SUPPLIES Misc Test strips order: Test strips for glucometer covered by patient's insurance. Sig: use once daily and prn ssx high or low sugar. 100 Each 3   • Lancets Misc Lancets order: Lancets for glucometer covered by patient's insurance. Sig: use once daily and prn ssx high or low sugar. 100 Each 3   • Cholecalciferol (VITAMIN D3) 2000 UNITS Tab Take  by mouth.       No current facility-administered medications for this visit.        Patient Active Problem List    Diagnosis Date Noted   • Chronic cough 03/17/2020   • Obesity (BMI 30-39.9) 10/24/2017   • DM type 2 with diabetic dyslipidemia (HCC) 07/15/2015   • Essential hypertension 07/15/2015   • Dyslipidemia 07/15/2015       Family History   Problem Relation Age of Onset   • Other Father         heart valve   • Heart Disease Father    • Cancer Mother         brain cancer   • Diabetes Neg Hx    • Hypertension Neg Hx    • Hyperlipidemia Neg Hx    • Stroke Neg Hx           Objective:     Vitals:    06/29/20 0920 06/29/20 0946   BP: 140/80 132/78   BP Location: Left arm    Patient Position: Sitting    BP Cuff Size: Adult    Pulse: 81    Temp: 36.3 °C (97.3 °F)    TempSrc: Temporal    Weight: 98.2 kg (216 lb 9.6 oz)    Height: 1.676 m (5' 6\")      Body mass index is 34.96 kg/m².     Physical Exam:  Gen: Well developed, well nourished in no acute distress. Obese male.   Skin: Pink, warm, and dry  HEENT: conjunctiva non-injected, sclera non-icteric. EOMs intact.   Neck: Supple, trachea " midline. No adenopathy or masses in the neck or supraclavicular regions.  Lungs: Effort is normal. Clear to auscultation bilaterally with good excursion.  CV: regular rate and rhythm.  Abdomen: soft, nontender, + BS. No HSM.   Ext: no edema, color normal, vascularity normal, temperature normal  Alert and oriented Eye contact is good, speech goal directed, affect calm    Results for orders placed or performed during the hospital encounter of 06/11/20   CBC WITH DIFFERENTIAL   Result Value Ref Range    WBC 10.7 4.8 - 10.8 K/uL    RBC 5.47 4.70 - 6.10 M/uL    Hemoglobin 14.8 14.0 - 18.0 g/dL    Hematocrit 46.6 42.0 - 52.0 %    MCV 85.2 81.4 - 97.8 fL    MCH 27.1 27.0 - 33.0 pg    MCHC 31.8 (L) 33.7 - 35.3 g/dL    RDW 42.7 35.9 - 50.0 fL    Platelet Count 259 164 - 446 K/uL    MPV 10.7 9.0 - 12.9 fL    Neutrophils-Polys 66.30 44.00 - 72.00 %    Lymphocytes 21.90 (L) 22.00 - 41.00 %    Monocytes 9.10 0.00 - 13.40 %    Eosinophils 1.60 0.00 - 6.90 %    Basophils 0.60 0.00 - 1.80 %    Immature Granulocytes 0.50 0.00 - 0.90 %    Nucleated RBC 0.00 /100 WBC    Neutrophils (Absolute) 7.08 1.82 - 7.42 K/uL    Lymphs (Absolute) 2.34 1.00 - 4.80 K/uL    Monos (Absolute) 0.97 (H) 0.00 - 0.85 K/uL    Eos (Absolute) 0.17 0.00 - 0.51 K/uL    Baso (Absolute) 0.06 0.00 - 0.12 K/uL    Immature Granulocytes (abs) 0.05 0.00 - 0.11 K/uL    NRBC (Absolute) 0.00 K/uL   VITAMIN D,25 HYDROXY   Result Value Ref Range    25-Hydroxy   Vitamin D 25 28 (L) 30 - 100 ng/mL   TSH   Result Value Ref Range    TSH 1.140 0.380 - 5.330 uIU/mL   Lipid Profile   Result Value Ref Range    Cholesterol,Tot 122 100 - 199 mg/dL    Triglycerides 151 (H) 0 - 149 mg/dL    HDL 32 (A) >=40 mg/dL    LDL 60 <100 mg/dL   MICROALBUMIN CREAT RATIO URINE   Result Value Ref Range    Creatinine, Urine 219.53 mg/dL    Microalbumin, Urine Random <1.2 mg/dL    Micro Alb Creat Ratio see below 0 - 30 mg/g   HEMOGLOBIN A1C   Result Value Ref Range    Glycohemoglobin 7.9 (H) 0.0 -  5.6 %    Est Avg Glucose 180 mg/dL   Comp Metabolic Panel   Result Value Ref Range    Sodium 138 135 - 145 mmol/L    Potassium 4.1 3.6 - 5.5 mmol/L    Chloride 104 96 - 112 mmol/L    Co2 22 20 - 33 mmol/L    Anion Gap 12.0 7.0 - 16.0    Glucose 157 (H) 65 - 99 mg/dL    Bun 14 8 - 22 mg/dL    Creatinine 0.71 0.50 - 1.40 mg/dL    Calcium 9.0 8.5 - 10.5 mg/dL    AST(SGOT) 18 12 - 45 U/L    ALT(SGPT) 35 2 - 50 U/L    Alkaline Phosphatase 78 30 - 99 U/L    Total Bilirubin 0.4 0.1 - 1.5 mg/dL    Albumin 4.4 3.2 - 4.9 g/dL    Total Protein 7.1 6.0 - 8.2 g/dL    Globulin 2.7 1.9 - 3.5 g/dL    A-G Ratio 1.6 g/dL   FASTING STATUS   Result Value Ref Range    Fasting Status Fasting    ESTIMATED GFR   Result Value Ref Range    GFR If African American >60 >60 mL/min/1.73 m 2    GFR If Non African American >60 >60 mL/min/1.73 m 2     Reviewed and discussed above labs with patient in office.      Assessment and Plan:   The following treatment plan was discussed:    1. DM type 2 with diabetic dyslipidemia (HCC)  Stable. Continue current medicines--no adjustments at this time. Stressed dietary modifications and exercise. Monitor labs regularly.  - Comp Metabolic Panel; Future  - Lipid Profile; Future  - CBC WITH DIFFERENTIAL; Future  - HEMOGLOBIN A1C; Future  - MICROALBUMIN CREAT RATIO URINE; Future    2. Essential hypertension  Not at goal. Will increase to 75 mg of losartan daily. Thinks might have acces to cuff to monitor blood pressure. May also come in to our clinic or clinic closer to his house for BP check through MA visit.   - losartan (COZAAR) 50 MG Tab; Take 1.5 Tabs by mouth every day.  Dispense: 90 Tab; Refill: 3  - Comp Metabolic Panel; Future    3. Obesity (BMI 30-39.9)  Patient's body mass index is 34.96 kg/m². Exercise and nutrition counseling were performed at this visit.    4. Chronic cough  Resolved. Intolerance to Ace inhibitors added to chart.    5. Screening for prostate cancer  discussed pros and cons with  patient he would like labs ordered  - PROSTATE SPECIFIC AG SCREENING; Future    Health Maintenance: eye exam records requested again from [facility].    Followup: Return in about 6 months (around 12/29/2020) for lab review, follow-up on DM, sooner if needed.

## 2020-07-27 ENCOUNTER — TELEPHONE (OUTPATIENT)
Dept: MEDICAL GROUP | Age: 60
End: 2020-07-27

## 2020-07-27 NOTE — TELEPHONE ENCOUNTER
Phone Number Called: [phone] (home)     Call outcome: Left detailed message for patient. Informed to call back with any additional questions.    Message: Received records request back from [facility] stating the patient has not been seen since 2018. Per Darrion's request, called and reminded patient to schedule an appointment with [facility] for his annual exam.

## 2020-08-31 DIAGNOSIS — I10 ESSENTIAL HYPERTENSION: ICD-10-CM

## 2020-09-01 RX ORDER — LOSARTAN POTASSIUM 50 MG/1
TABLET ORAL
Qty: 135 TAB | Refills: 3 | OUTPATIENT
Start: 2020-09-01

## 2020-09-01 NOTE — TELEPHONE ENCOUNTER
Received request via: Pharmacy    Was the patient seen in the last year in this department? Yes    Does the patient have an active prescription (recently filled or refills available) for medication(s) requested? Yes. Refill request has been refused in Epic. Contacted pharmacy and called in most recent prescription.

## 2021-01-18 ENCOUNTER — OFFICE VISIT (OUTPATIENT)
Dept: MEDICAL GROUP | Age: 61
End: 2021-01-18
Payer: COMMERCIAL

## 2021-01-18 VITALS
SYSTOLIC BLOOD PRESSURE: 138 MMHG | OXYGEN SATURATION: 98 % | DIASTOLIC BLOOD PRESSURE: 82 MMHG | TEMPERATURE: 97.4 F | BODY MASS INDEX: 34.39 KG/M2 | HEIGHT: 66 IN | HEART RATE: 75 BPM | WEIGHT: 214 LBS

## 2021-01-18 DIAGNOSIS — I10 ESSENTIAL HYPERTENSION: ICD-10-CM

## 2021-01-18 DIAGNOSIS — E11.69 DM TYPE 2 WITH DIABETIC DYSLIPIDEMIA (HCC): ICD-10-CM

## 2021-01-18 DIAGNOSIS — E66.9 OBESITY (BMI 30-39.9): ICD-10-CM

## 2021-01-18 DIAGNOSIS — E78.5 DM TYPE 2 WITH DIABETIC DYSLIPIDEMIA (HCC): ICD-10-CM

## 2021-01-18 LAB
HBA1C MFR BLD: 8.2 % (ref 0–5.6)
INT CON NEG: ABNORMAL
INT CON POS: ABNORMAL

## 2021-01-18 PROCEDURE — 83036 HEMOGLOBIN GLYCOSYLATED A1C: CPT | Performed by: PHYSICIAN ASSISTANT

## 2021-01-18 PROCEDURE — 99214 OFFICE O/P EST MOD 30 MIN: CPT | Performed by: PHYSICIAN ASSISTANT

## 2021-01-18 RX ORDER — LOSARTAN POTASSIUM 100 MG/1
75 TABLET ORAL DAILY
Qty: 90 TAB | Refills: 3 | Status: SHIPPED | OUTPATIENT
Start: 2021-01-18 | End: 2023-09-08

## 2021-01-18 RX ORDER — PIOGLITAZONEHYDROCHLORIDE 15 MG/1
15 TABLET ORAL DAILY
Qty: 90 TAB | Refills: 2 | Status: SHIPPED | OUTPATIENT
Start: 2021-01-18 | End: 2023-09-18

## 2021-01-18 RX ORDER — GLIPIZIDE 5 MG/1
2.5 TABLET ORAL 2 TIMES DAILY
Qty: 90 TAB | Refills: 2 | Status: SHIPPED | OUTPATIENT
Start: 2021-01-18 | End: 2023-09-18

## 2021-01-18 ASSESSMENT — PATIENT HEALTH QUESTIONNAIRE - PHQ9: CLINICAL INTERPRETATION OF PHQ2 SCORE: 0

## 2021-01-18 ASSESSMENT — FIBROSIS 4 INDEX: FIB4 SCORE: 0.68

## 2021-01-18 NOTE — PROGRESS NOTES
"RN-CDE Note    Subjective:     HPI:  Jovon Whatley is a 60 y.o. old patient who is seen today for review of his uncontrolled type 2 diabetes.  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   Diet: \"unhealthy\" diet in general  fast food: average no. meals per week: daily on way to work.   meals per day on average: 3  Patient's body mass index is 34.54 kg/m². Exercise and nutrition counseling were performed at this visit.      Health Maintenance:   Health Maintenance Due   Topic Date Due   • IMM ZOSTER VACCINES (1 of 2) 08/10/2012   • RETINAL SCREENING  10/17/2019   • IMM INFLUENZA (1) 09/01/2020   • A1C SCREENING  12/11/2020   • DIABETES MONOFILAMENT / LE EXAM  01/27/2021         DM:   Last A1c:   Lab Results   Component Value Date/Time    HBA1C 8.2 (A) 01/18/2021 10:34 AM      A1C GOAL: < 7    Glucose monitoring frequency: testing on occasion    Hypoglycemic episodes: no    Last Retinal Exam: past due  Daily Foot Exam: Yes denies problems.     Lab Results   Component Value Date/Time    MALBCRT see below 06/11/2020 08:16 AM    MICROALBUR <1.2 06/11/2020 08:16 AM        ACR Albumin/Creatinine Ratio goal <30     HTN:   Blood pressure goal <140/<80 at goal.   Currently Rx ACE/ARB: Yes , Losartan 75 mg    Dyslipidemia:    Lab Results   Component Value Date/Time    CHOLSTRLTOT 122 06/11/2020 08:16 AM    LDL 60 06/11/2020 08:16 AM    HDL 32 (A) 06/11/2020 08:16 AM    TRIGLYCERIDE 151 (H) 06/11/2020 08:16 AM         Currently Rx Statin: Yes  Simvastatin 10 mg daily    He  reports that he has never smoked. He has never used smokeless tobacco.    Objective:     Exam:  Monofilament: done   Monofilament testing with a 10 gram force: sensation intact: intact bilaterally  Visual " Inspection: Feet without maceration, ulcers, fissures.  Pedal pulses: intact bilaterally    Plan:     Discussed and educated on:   - All medications, side effects and compliance (discussed carefully)  - Annual eye examinations at Ophthalmology  - Foot Care: what to look for when checking feet every day and when to contact HCP  - HbA1C: target  - Home glucose monitoring emphasized  - Weight control and daily exercise    Recommended medication changes: continue with Metformin 1000 mg in the morning and 1500 mg at hs.  Increase Glipizide to 2.5 mg bid and add Pioglitazone 15 mg daily

## 2021-01-18 NOTE — PROGRESS NOTES
Subjective:     HPI    Chief Complaint   Patient presents with   • Follow-Up   • Diabetes Mellitus       Jovon Whatley is a 60 y.o. male who presents for follow up of chronic conditions of diabetes mellitus, hypertension, hyperlipidemia.    RN-CDE notes reviewed including HPI for DM, HTN, Hyperlipidemia.  Exercise frequency and limitations reviewed.  Feet symptoms reviewed.  Nutritional status reviewed.  Retinal eye exam history reviewed.    Patient additionally reports:    He is under a lot of stress with work. Has been working a lot of overtime.   Overall OK. Diet still suffers.      Patient Active Problem List    Diagnosis Date Noted   • Chronic cough 03/17/2020   • Obesity (BMI 30-39.9) 10/24/2017   • DM type 2 with diabetic dyslipidemia (HCC) 07/15/2015   • Essential hypertension 07/15/2015   • Dyslipidemia 07/15/2015       Health Maintenance/Immunizations:   Health Maintenance Topics with due status: Overdue       Topic Date Due    IMM ZOSTER VACCINES 08/10/2012- declines    RETINAL SCREENING 10/17/2019- advised to complete ASAP       Current medications including changes today:  Current Outpatient Medications   Medication Sig Dispense Refill   • glipiZIDE (GLUCOTROL) 5 MG Tab Take 0.5 Tabs by mouth 2 times a day. 90 Tab 2   • pioglitazone (ACTOS) 15 MG Tab Take 1 Tab by mouth every day. 90 Tab 2   • losartan (COZAAR) 50 MG Tab Take 1.5 Tabs by mouth every day. 90 Tab 3   • metformin (GLUCOPHAGE) 1000 MG tablet TAKE ONE TABLET BY MOUTH EVERY MORNING AND TAKE 1 AND 1/2 TABLETS EVERY NIGHT AT BEDTIME. TAKE WITH MEALS 225 Tab 3   • simvastatin (ZOCOR) 10 MG Tab TAKE ONE TABLET BY MOUTH EVERY EVENING 90 Tab 3   • aspirin (ASA) 81 MG Chew Tab chewable tablet Take 1 Tab by mouth every day. 100 Tab    • Multiple Vitamin (MULTI VITAMIN DAILY PO) Take  by mouth.     • Blood Glucose Monitoring Suppl Device Dispense:Glucometer covered by patient's insurance 1 Device 0   • Blood Glucose Monitoring Suppl SUPPLIES  "Misc Test strips order: Test strips for glucometer covered by patient's insurance. Sig: use once daily and prn ssx high or low sugar. 100 Each 3   • Lancets Misc Lancets order: Lancets for glucometer covered by patient's insurance. Sig: use once daily and prn ssx high or low sugar. 100 Each 3   • Cholecalciferol (VITAMIN D3) 2000 UNITS Tab Take  by mouth.       No current facility-administered medications for this visit.        Allergies:   Allergies   Allergen Reactions   • Ace Inhibitors Cough        Social History     Tobacco Use   • Smoking status: Never Smoker   • Smokeless tobacco: Never Used   Substance Use Topics   • Alcohol use: No     Frequency: Never     Binge frequency: Never   • Drug use: No       Family History   Problem Relation Age of Onset   • Other Father         heart valve   • Heart Disease Father    • Cancer Mother         brain cancer   • Diabetes Neg Hx    • Hypertension Neg Hx    • Hyperlipidemia Neg Hx    • Stroke Neg Hx          Objective:     /82 (BP Location: Left arm, Patient Position: Sitting)   Pulse 75   Temp 36.3 °C (97.4 °F) (Temporal)   Ht 1.676 m (5' 6\")   Wt 97.1 kg (214 lb)   SpO2 98%   BMI 34.54 kg/m²     Alert, oriented in no acute distress. Obese male.  Eye contact is good, speech goal directed, affect calm.  HEENT: EOMI, PERRL, conjunctiva non-injected, sclera non-icteric.  Neck supple with no cervical lymphadenopathy, JVD, palpable thyroid nodules.  Lungs: clear to auscultation bilaterally with good excursion.  CV: regular rate and rhythm.  Abdomen soft, non-distended, non-tender with normal bowel sounds. No CVAT  Lower extremities warm with no edema.    Lab Results   Component Value Date/Time    HBA1C 8.2 (A) 01/18/2021 10:34 AM    HBA1C 7.9 (H) 06/11/2020 08:16 AM    HBA1C 7.9 (H) 01/16/2020 08:49 AM        Assessment/Plan:     1. DM type 2 with diabetic dyslipidemia (HCC)  Not adequately controlled. DM2 A1c is not at goal at 8.2%. Will increase glipizide to " 2.5 mg BID and add low dose actos.   Monofilament normal-completed by RN CDE.  - POCT Hemoglobin A1C  - Diabetic Monofilament LE Exam  - glipiZIDE (GLUCOTROL) 5 MG Tab; Take 0.5 Tabs by mouth 2 times a day.  Dispense: 90 Tab; Refill: 2  - pioglitazone (ACTOS) 15 MG Tab; Take 1 Tab by mouth every day.  Dispense: 90 Tab; Refill: 2    2. Essential hypertension   Not adequately controlled.   BP Readings from Last 1 Encounters:   01/18/21 138/82    Will increase losartan from 75 mg to 100 mg daily. Encouraged daily home monitoring.  - losartan (COZAAR) 100 MG Tab; Take 1 Tab by mouth every day.  Dispense: 90 Tab; Refill: 3    3. Obesity (BMI 30-39.9)   Encouraged dietary modifications and exercise. Will start with reducing Ceron's breakfast.    -RN-CDE notes reviewed and discussed.  -Patient's body mass index is 34.54 kg/m². Exercise and nutrition counseling were performed at this visit.   Additionally discussed disease management and weight loss goals.   -Education and advice provided today: See RN-CDE note.      Follow-up:   Return in about 3 months (around 4/18/2021). sooner should new symptoms or problems arise.

## 2021-01-22 DIAGNOSIS — E11.69 DM TYPE 2 WITH DIABETIC DYSLIPIDEMIA (HCC): ICD-10-CM

## 2021-01-22 DIAGNOSIS — E78.5 DM TYPE 2 WITH DIABETIC DYSLIPIDEMIA (HCC): ICD-10-CM

## 2021-01-22 RX ORDER — GLIPIZIDE 5 MG/1
TABLET ORAL
Qty: 45 TAB | Refills: 3 | OUTPATIENT
Start: 2021-01-22

## 2021-02-14 DIAGNOSIS — E11.69 DM TYPE 2 WITH DIABETIC DYSLIPIDEMIA (HCC): ICD-10-CM

## 2021-02-14 DIAGNOSIS — E78.5 DM TYPE 2 WITH DIABETIC DYSLIPIDEMIA (HCC): ICD-10-CM

## 2021-02-16 RX ORDER — SIMVASTATIN 10 MG
TABLET ORAL
Qty: 90 TABLET | Refills: 3 | Status: SHIPPED | OUTPATIENT
Start: 2021-02-16 | End: 2023-09-18 | Stop reason: SDUPTHER

## 2021-03-15 DIAGNOSIS — Z23 NEED FOR VACCINATION: ICD-10-CM

## 2021-04-19 ENCOUNTER — APPOINTMENT (OUTPATIENT)
Dept: MEDICAL GROUP | Age: 61
End: 2021-04-19
Payer: COMMERCIAL

## 2021-04-23 ENCOUNTER — HOSPITAL ENCOUNTER (OUTPATIENT)
Dept: LAB | Facility: MEDICAL CENTER | Age: 61
End: 2021-04-23
Attending: PHYSICIAN ASSISTANT
Payer: COMMERCIAL

## 2021-04-23 DIAGNOSIS — E78.5 DM TYPE 2 WITH DIABETIC DYSLIPIDEMIA (HCC): ICD-10-CM

## 2021-04-23 DIAGNOSIS — I10 ESSENTIAL HYPERTENSION: ICD-10-CM

## 2021-04-23 DIAGNOSIS — Z12.5 SCREENING FOR PROSTATE CANCER: ICD-10-CM

## 2021-04-23 DIAGNOSIS — E11.69 DM TYPE 2 WITH DIABETIC DYSLIPIDEMIA (HCC): ICD-10-CM

## 2021-04-23 LAB
ALBUMIN SERPL BCP-MCNC: 4.3 G/DL (ref 3.2–4.9)
ALBUMIN/GLOB SERPL: 1.5 G/DL
ALP SERPL-CCNC: 78 U/L (ref 30–99)
ALT SERPL-CCNC: 21 U/L (ref 2–50)
ANION GAP SERPL CALC-SCNC: 7 MMOL/L (ref 7–16)
AST SERPL-CCNC: 15 U/L (ref 12–45)
BASOPHILS # BLD AUTO: 0.3 % (ref 0–1.8)
BASOPHILS # BLD: 0.04 K/UL (ref 0–0.12)
BILIRUB SERPL-MCNC: 0.6 MG/DL (ref 0.1–1.5)
BUN SERPL-MCNC: 18 MG/DL (ref 8–22)
CALCIUM SERPL-MCNC: 8.9 MG/DL (ref 8.5–10.5)
CHLORIDE SERPL-SCNC: 99 MMOL/L (ref 96–112)
CHOLEST SERPL-MCNC: 127 MG/DL (ref 100–199)
CO2 SERPL-SCNC: 24 MMOL/L (ref 20–33)
CREAT SERPL-MCNC: 0.83 MG/DL (ref 0.5–1.4)
EOSINOPHIL # BLD AUTO: 0.18 K/UL (ref 0–0.51)
EOSINOPHIL NFR BLD: 1.5 % (ref 0–6.9)
ERYTHROCYTE [DISTWIDTH] IN BLOOD BY AUTOMATED COUNT: 44.7 FL (ref 35.9–50)
EST. AVERAGE GLUCOSE BLD GHB EST-MCNC: 148 MG/DL
FASTING STATUS PATIENT QL REPORTED: NORMAL
GLOBULIN SER CALC-MCNC: 2.8 G/DL (ref 1.9–3.5)
GLUCOSE SERPL-MCNC: 145 MG/DL (ref 65–99)
HBA1C MFR BLD: 6.8 % (ref 4–5.6)
HCT VFR BLD AUTO: 47 % (ref 42–52)
HDLC SERPL-MCNC: 31 MG/DL
HGB BLD-MCNC: 14.7 G/DL (ref 14–18)
IMM GRANULOCYTES # BLD AUTO: 0.09 K/UL (ref 0–0.11)
IMM GRANULOCYTES NFR BLD AUTO: 0.8 % (ref 0–0.9)
LDLC SERPL CALC-MCNC: 70 MG/DL
LYMPHOCYTES # BLD AUTO: 2.04 K/UL (ref 1–4.8)
LYMPHOCYTES NFR BLD: 17.1 % (ref 22–41)
MCH RBC QN AUTO: 27.3 PG (ref 27–33)
MCHC RBC AUTO-ENTMCNC: 31.3 G/DL (ref 33.7–35.3)
MCV RBC AUTO: 87.2 FL (ref 81.4–97.8)
MONOCYTES # BLD AUTO: 1.28 K/UL (ref 0–0.85)
MONOCYTES NFR BLD AUTO: 10.7 % (ref 0–13.4)
NEUTROPHILS # BLD AUTO: 8.29 K/UL (ref 1.82–7.42)
NEUTROPHILS NFR BLD: 69.6 % (ref 44–72)
NRBC # BLD AUTO: 0 K/UL
NRBC BLD-RTO: 0 /100 WBC
PLATELET # BLD AUTO: 252 K/UL (ref 164–446)
PMV BLD AUTO: 11.5 FL (ref 9–12.9)
POTASSIUM SERPL-SCNC: 3.9 MMOL/L (ref 3.6–5.5)
PROT SERPL-MCNC: 7.1 G/DL (ref 6–8.2)
RBC # BLD AUTO: 5.39 M/UL (ref 4.7–6.1)
SODIUM SERPL-SCNC: 130 MMOL/L (ref 135–145)
TRIGL SERPL-MCNC: 132 MG/DL (ref 0–149)
WBC # BLD AUTO: 11.9 K/UL (ref 4.8–10.8)

## 2021-04-23 PROCEDURE — 85025 COMPLETE CBC W/AUTO DIFF WBC: CPT

## 2021-04-23 PROCEDURE — 84153 ASSAY OF PSA TOTAL: CPT

## 2021-04-23 PROCEDURE — 82043 UR ALBUMIN QUANTITATIVE: CPT

## 2021-04-23 PROCEDURE — 82570 ASSAY OF URINE CREATININE: CPT

## 2021-04-23 PROCEDURE — 80061 LIPID PANEL: CPT

## 2021-04-23 PROCEDURE — 80053 COMPREHEN METABOLIC PANEL: CPT

## 2021-04-23 PROCEDURE — 83036 HEMOGLOBIN GLYCOSYLATED A1C: CPT

## 2021-04-23 PROCEDURE — 36415 COLL VENOUS BLD VENIPUNCTURE: CPT

## 2021-04-24 LAB
CREAT UR-MCNC: 80.1 MG/DL
MICROALBUMIN UR-MCNC: <1.2 MG/DL
MICROALBUMIN/CREAT UR: NORMAL MG/G (ref 0–30)
PSA SERPL-MCNC: 0.96 NG/ML (ref 0–4)

## 2021-05-04 ENCOUNTER — OFFICE VISIT (OUTPATIENT)
Dept: MEDICAL GROUP | Age: 61
End: 2021-05-04
Payer: COMMERCIAL

## 2021-05-04 VITALS
HEART RATE: 86 BPM | SYSTOLIC BLOOD PRESSURE: 118 MMHG | DIASTOLIC BLOOD PRESSURE: 68 MMHG | HEIGHT: 66 IN | TEMPERATURE: 97.7 F | OXYGEN SATURATION: 96 % | WEIGHT: 207.6 LBS | BODY MASS INDEX: 33.37 KG/M2

## 2021-05-04 DIAGNOSIS — D72.820 LYMPHOCYTOSIS: ICD-10-CM

## 2021-05-04 DIAGNOSIS — E78.5 DM TYPE 2 WITH DIABETIC DYSLIPIDEMIA (HCC): ICD-10-CM

## 2021-05-04 DIAGNOSIS — E11.69 DM TYPE 2 WITH DIABETIC DYSLIPIDEMIA (HCC): ICD-10-CM

## 2021-05-04 DIAGNOSIS — E66.9 OBESITY (BMI 30-39.9): ICD-10-CM

## 2021-05-04 DIAGNOSIS — L98.9 SKIN LESION OF FACE: ICD-10-CM

## 2021-05-04 DIAGNOSIS — I10 ESSENTIAL HYPERTENSION: ICD-10-CM

## 2021-05-04 PROCEDURE — 99214 OFFICE O/P EST MOD 30 MIN: CPT | Performed by: PHYSICIAN ASSISTANT

## 2021-05-04 ASSESSMENT — FIBROSIS 4 INDEX: FIB4 SCORE: 0.75

## 2021-05-04 NOTE — PROGRESS NOTES
"  Subjective:     Chief Complaint   Patient presents with   • Diabetes     Follow-up     Jovon Whatley is a 60 y.o. male here today for evaluation and management of:    DM type 2 with diabetic dyslipidemia (HCC)/Obesity (BMI 30-39.9)  Taking meds as prescribed. No side effects. Monitoring blood sugar-no hypoglycemic events.  Diet fair--coaching two baseball teams.  Last eye exam 4/22/21- no retinopathy    Essential hypertension  Taking meds as prescribed. No side effects.     Skin lesion  Erupted about one month ago quickly. \"black thing\" on top which was obstructing view so he used OTC cryotherapy. Repeated again yesterday--swelled up. Black fell off.    Lymphocytosis  No fever, chills, unexplained weight loss. No night sweats.   Under stress--son recent concussion.   Working a lot.      Current medicines (including changes today)  Current Outpatient Medications   Medication Sig Dispense Refill   • simvastatin (ZOCOR) 10 MG Tab TAKE 1 TABLET BY MOUTH EVERY DAY IN THE EVENING 90 tablet 3   • metformin (GLUCOPHAGE) 1000 MG tablet TAKE ONE TABLET BY MOUTH EVERY MORNING AND TAKE 1 AND 1/2 TABLETS EVERY NIGHT AT BEDTIME. TAKE WITH MEALS 225 Tab 3   • glipiZIDE (GLUCOTROL) 5 MG Tab Take 0.5 Tabs by mouth 2 times a day. 90 Tab 2   • pioglitazone (ACTOS) 15 MG Tab Take 1 Tab by mouth every day. 90 Tab 2   • losartan (COZAAR) 100 MG Tab Take 1 Tab by mouth every day. 90 Tab 3   • aspirin (ASA) 81 MG Chew Tab chewable tablet Take 1 Tab by mouth every day. 100 Tab    • Multiple Vitamin (MULTI VITAMIN DAILY PO) Take  by mouth.     • Blood Glucose Monitoring Suppl Device Dispense:Glucometer covered by patient's insurance 1 Device 0   • Blood Glucose Monitoring Suppl SUPPLIES Misc Test strips order: Test strips for glucometer covered by patient's insurance. Sig: use once daily and prn ssx high or low sugar. 100 Each 3   • Lancets Misc Lancets order: Lancets for glucometer covered by patient's insurance. Sig: use once " "daily and prn ssx high or low sugar. 100 Each 3     No current facility-administered medications for this visit.        Objective:     Vitals:    05/04/21 0852   BP: 118/68   BP Location: Left arm   Patient Position: Sitting   BP Cuff Size: Adult   Pulse: 86   Temp: 36.5 °C (97.7 °F)   TempSrc: Temporal   SpO2: 96%   Weight: 94.2 kg (207 lb 9.6 oz)   Height: 1.676 m (5' 6\")         Physical Exam:  Constitutional: Alert, no distress, well-groomed.Body mass index is 33.51 kg/m².   Skin: Warm, dry, good turgor, no rashes in visible areas. Perfectly round, flesh colored papule just under right eye. Some surrounding erythema.  Eye: Equal, round and reactive, conjunctiva clear, lids normal.  Neck: Trachea midline, no masses, no thyromegaly.  Cardiovascular: Regular rate and rhythm.  Chest: Effort normal. Clear to auscultation throughout. No adventitious sounds.   Abdomen: Soft, no gross masses.  MSK: Normal gait, moves all extremities.  Neuro: Grossly non-focal. No cranial nerve deficit. Strength and sensation intact.   Psych: Alert and oriented x3, normal affect and mood.      Results for orders placed or performed during the hospital encounter of 04/23/21   PROSTATE SPECIFIC AG SCREENING   Result Value Ref Range    Prostatic Specific Antigen Tot 0.96 0.00 - 4.00 ng/mL   Comp Metabolic Panel   Result Value Ref Range    Sodium 130 (L) 135 - 145 mmol/L    Potassium 3.9 3.6 - 5.5 mmol/L    Chloride 99 96 - 112 mmol/L    Co2 24 20 - 33 mmol/L    Anion Gap 7.0 7.0 - 16.0    Glucose 145 (H) 65 - 99 mg/dL    Bun 18 8 - 22 mg/dL    Creatinine 0.83 0.50 - 1.40 mg/dL    Calcium 8.9 8.5 - 10.5 mg/dL    AST(SGOT) 15 12 - 45 U/L    ALT(SGPT) 21 2 - 50 U/L    Alkaline Phosphatase 78 30 - 99 U/L    Total Bilirubin 0.6 0.1 - 1.5 mg/dL    Albumin 4.3 3.2 - 4.9 g/dL    Total Protein 7.1 6.0 - 8.2 g/dL    Globulin 2.8 1.9 - 3.5 g/dL    A-G Ratio 1.5 g/dL   Lipid Profile   Result Value Ref Range    Cholesterol,Tot 127 100 - 199 mg/dL    " Triglycerides 132 0 - 149 mg/dL    HDL 31 (A) >=40 mg/dL    LDL 70 <100 mg/dL   CBC WITH DIFFERENTIAL   Result Value Ref Range    WBC 11.9 (H) 4.8 - 10.8 K/uL    RBC 5.39 4.70 - 6.10 M/uL    Hemoglobin 14.7 14.0 - 18.0 g/dL    Hematocrit 47.0 42.0 - 52.0 %    MCV 87.2 81.4 - 97.8 fL    MCH 27.3 27.0 - 33.0 pg    MCHC 31.3 (L) 33.7 - 35.3 g/dL    RDW 44.7 35.9 - 50.0 fL    Platelet Count 252 164 - 446 K/uL    MPV 11.5 9.0 - 12.9 fL    Neutrophils-Polys 69.60 44.00 - 72.00 %    Lymphocytes 17.10 (L) 22.00 - 41.00 %    Monocytes 10.70 0.00 - 13.40 %    Eosinophils 1.50 0.00 - 6.90 %    Basophils 0.30 0.00 - 1.80 %    Immature Granulocytes 0.80 0.00 - 0.90 %    Nucleated RBC 0.00 /100 WBC    Neutrophils (Absolute) 8.29 (H) 1.82 - 7.42 K/uL    Lymphs (Absolute) 2.04 1.00 - 4.80 K/uL    Monos (Absolute) 1.28 (H) 0.00 - 0.85 K/uL    Eos (Absolute) 0.18 0.00 - 0.51 K/uL    Baso (Absolute) 0.04 0.00 - 0.12 K/uL    Immature Granulocytes (abs) 0.09 0.00 - 0.11 K/uL    NRBC (Absolute) 0.00 K/uL   HEMOGLOBIN A1C   Result Value Ref Range    Glycohemoglobin 6.8 (H) 4.0 - 5.6 %    Est Avg Glucose 148 mg/dL   MICROALBUMIN CREAT RATIO URINE   Result Value Ref Range    Creatinine, Urine 80.10 mg/dL    Microalbumin, Urine Random <1.2 mg/dL    Micro Alb Creat Ratio see below 0 - 30 mg/g   FASTING STATUS   Result Value Ref Range    Fasting Status Fasting    ESTIMATED GFR   Result Value Ref Range    GFR If African American >60 >60 mL/min/1.73 m 2    GFR If Non African American >60 >60 mL/min/1.73 m 2    Reviewed and discussed above labs with patient in visit.     Assessment and Plan:   The following treatment plan was discussed:     1. DM type 2 with diabetic dyslipidemia (HCC)  Chronic, stable. Continue current medicines. Monitor labs regularly.   - CBC WITH DIFFERENTIAL; Future  - Comp Metabolic Panel; Future  - Lipid Profile; Future  - HEMOGLOBIN A1C; Future  - MICROALBUMIN CREAT RATIO URINE; Future    2. Essential  hypertension  Chronic, stable. Continue current medicines. Monitor labs regularly.   - Comp Metabolic Panel; Future    3. Obesity (BMI 30-39.9)  Patient's body mass index is 33.51 kg/m². Exercise and nutrition counseling were performed at this visit.     4. Skin lesion of face  Quickly growing lesion on face, cannot exclude cancer. Patient will leave it alone. Will get him in with derm urgently for excision. Located on delicate skin just under right eye.  - REFERRAL TO DERMATOLOGY    5. Lymphocytosis  Uncertain etiology as no recent infection. Will repeat in 1 month, nonfasting. Will determine plan based off next CBC in 1 month.  - CBC WITH DIFFERENTIAL; Future- 1 month  - CBC WITH DIFFERENTIAL; Future- 6 months    Followup: Return in about 6 months (around 11/4/2021) for follow-up on Diabetes and lab review, sooner if needed.

## 2021-06-18 ENCOUNTER — HOSPITAL ENCOUNTER (EMERGENCY)
Facility: MEDICAL CENTER | Age: 61
End: 2021-06-18
Attending: EMERGENCY MEDICINE
Payer: COMMERCIAL

## 2021-06-18 VITALS
BODY MASS INDEX: 33.84 KG/M2 | SYSTOLIC BLOOD PRESSURE: 125 MMHG | OXYGEN SATURATION: 98 % | WEIGHT: 210.54 LBS | TEMPERATURE: 97.9 F | RESPIRATION RATE: 16 BRPM | HEIGHT: 66 IN | DIASTOLIC BLOOD PRESSURE: 75 MMHG | HEART RATE: 78 BPM

## 2021-06-18 DIAGNOSIS — N48.30 PRIAPISM: ICD-10-CM

## 2021-06-18 PROCEDURE — 700111 HCHG RX REV CODE 636 W/ 250 OVERRIDE (IP): Performed by: EMERGENCY MEDICINE

## 2021-06-18 PROCEDURE — 54220 IRRG CRPRA CAVRNOSA PRIAPISM: CPT

## 2021-06-18 PROCEDURE — 306637 HCHG MISC ORTHO ITEM RC 0274

## 2021-06-18 PROCEDURE — 99283 EMERGENCY DEPT VISIT LOW MDM: CPT

## 2021-06-18 PROCEDURE — 700101 HCHG RX REV CODE 250: Performed by: EMERGENCY MEDICINE

## 2021-06-18 PROCEDURE — 700105 HCHG RX REV CODE 258: Performed by: EMERGENCY MEDICINE

## 2021-06-18 RX ORDER — LIDOCAINE HYDROCHLORIDE 20 MG/ML
20 INJECTION, SOLUTION INFILTRATION; PERINEURAL ONCE
Status: DISCONTINUED | OUTPATIENT
Start: 2021-06-18 | End: 2021-06-18 | Stop reason: CLARIF

## 2021-06-18 RX ORDER — LIDOCAINE HYDROCHLORIDE 10 MG/ML
20 INJECTION, SOLUTION INFILTRATION; PERINEURAL ONCE
Status: COMPLETED | OUTPATIENT
Start: 2021-06-18 | End: 2021-06-18

## 2021-06-18 RX ORDER — LIDOCAINE HYDROCHLORIDE AND EPINEPHRINE 10; 10 MG/ML; UG/ML
20 INJECTION, SOLUTION INFILTRATION; PERINEURAL ONCE
Status: DISCONTINUED | OUTPATIENT
Start: 2021-06-18 | End: 2021-06-18 | Stop reason: CLARIF

## 2021-06-18 RX ADMIN — PHENYLEPHRINE HYDROCHLORIDE 20 MG: 10 INJECTION INTRAVENOUS at 11:55

## 2021-06-18 RX ADMIN — LIDOCAINE HYDROCHLORIDE 20 ML: 10 INJECTION, SOLUTION INFILTRATION; PERINEURAL at 10:30

## 2021-06-18 ASSESSMENT — FIBROSIS 4 INDEX: FIB4 SCORE: 0.75

## 2021-06-18 NOTE — DISCHARGE INSTRUCTIONS
Follow-up with primary care 1 to 2 days for reevaluation, medication management.  Follow-up with urology next week for reevaluation of recurrent priapism.    Tylenol or ibuprofen as needed for discomfort.  Apply ice, 20 minutes of every hour for the next 12 hours.    Return to the emergency department for recurrent or persistent erection, difficulty urinating, abdominal pain, fever, vomiting or other new concerns.

## 2021-06-18 NOTE — ED TRIAGE NOTES
"PT ambulated to triage c/o priapism x 3 hours. Per report pt woke up at 6 am with an erection. PT reports he has had the same twice before both requiring ER visits.  Chief Complaint   Patient presents with   • Priapism     approx 3 hours      /80   Pulse 80   Temp 36.2 °C (97.2 °F) (Temporal)   Resp 18   Ht 1.676 m (5' 6\")   Wt 95.5 kg (210 lb 8.6 oz)   SpO2 96%            Chief Complaint   Patient presents with   • Priapism     approx 3 hours      /80   Pulse 80   Temp 36.2 °C (97.2 °F) (Temporal)   Resp 18   Ht 1.676 m (5' 6\")   Wt 95.5 kg (210 lb 8.6 oz)   SpO2 96%     "

## 2021-06-18 NOTE — ED PROVIDER NOTES
"ED Provider Note    CHIEF COMPLAINT  Chief Complaint   Patient presents with   • Priapism     approx 3 hours        HPI  Jovon Whatley is a 60 y.o. male who presents to the emergency department through triage for priapism.  Patient describes history of the same twice previously requiring medical intervention.  Last evaluation was 12 years ago.  He has required drainage.  Patient states he awoke at 6:00 this morning with an erection and it has persisted since that time.  He starting to have some throbbing and discomfort in his penis, groin.  No abdominal pain.  No fever chills.  No nausea or vomiting.  Patient has been able to urinate without difficulty.    Adamantly denies any new medication or supplement use.  Denies psychiatric medications, sleeping medications.  Denies trazodone.  Denies Viagra or other male enhancement drugs.  Denies trauma or injury.      REVIEW OF SYSTEMS  See HPI for further details. All other systems are negative.     PAST MEDICAL HISTORY   has a past medical history of Diabetes (HCC), Hyperlipidemia, and Hypertension.    SOCIAL HISTORY  Social History     Tobacco Use   • Smoking status: Never Smoker   • Smokeless tobacco: Never Used   Vaping Use   • Vaping Use: Never used   Substance and Sexual Activity   • Alcohol use: Never   • Drug use: Yes     Frequency: 5.0 times per week     Types: Marijuana, Inhaled     Comment: Marijuana To relax    • Sexual activity: Never     Partners: Female       SURGICAL HISTORY   has a past surgical history that includes ankle orif (Right) and appendectomy.    CURRENT MEDICATIONS  Home Medications    **Home medications have not yet been reviewed for this encounter**         ALLERGIES  Allergies   Allergen Reactions   • Ace Inhibitors Cough       PHYSICAL EXAM  VITAL SIGNS: /78   Pulse 75   Temp 36.4 °C (97.5 °F) (Temporal)   Resp 16   Ht 1.676 m (5' 6\")   Wt 95.5 kg (210 lb 8.6 oz)   SpO2 97%   BMI 33.98 kg/m²   Pulse ox interpretation: I " interpret this pulse ox as normal.  Constitutional: Alert in no apparent distress.  HENT: Normocephalic, atraumatic. Bilateral external ears normal, Nose normal.   Eyes: Pupils are equal and reactive, Conjunctiva normal.   Neck: Normal range of motion, Supple  Lymphatic: No lymphadenopathy noted.   Cardiovascular: Regular rate and rhythm, no murmurs. Distal pulses intact.   Thorax & Lungs: Normal breath sounds.  No wheezing/rales/ronchi. No increased work of breathing  Abdomen: Soft, non-distended, non-tender to palpation.  : Circumcised, erect penis.  2 descended testicles.  Skin: Warm, Dry, No erythema, No rash.   Musculoskeletal: Good range of motion in all major joints.   Neurologic: Alert and orient x4.  Moves her extremity spontaneously.  Psychiatric: Affect normal, Judgment normal, Mood normal.       DIAGNOSTIC STUDIES / PROCEDURES    PROCEDURE   corporal aspiration, irrigation and phenylephrine infusion.  Indication: Priapism  Penis was prepped and draped in sterile fashion.  The penis was anesthetized with dorsal block using total 20 cc lidocaine 1%.  14-gauge Angiocath was inserted into each corpus cavernosum muscle body.  Immediate and copious dark blood return.  After blood drainage, each side was irrigated with normal saline.  Additional drainage allowed before injection of 2 mL each, total for mL phenylephrine at 50 mcg/cc.  Bleeding slowed significantly.  Angiocath were removed.  Penis was flaccid.  Total aspiration irrigation took approximately 45 minutes.  Patient tolerated procedure well.  Hemodynamically stable throughout.      COURSE & MEDICAL DECISION MAKING  ED evaluation did demonstrate priapism, patient has history of the same, although adamantly denies any medication use which would cause such.  After review of his previous notes, 2005 he was seen for the same thing, required aspiration, irrigation and phenylephrine infusion after taking trazodone.    1030 -Dr. Meadows is aware of the  patient presentation and agrees with bedside corporal irrigation.    Procedure as described above was uncomplicated.  Patient tolerated procedure well.  After prolonged aspiration, followed by irrigation and phenylephrine infusion priapism has resolved, penis is flaccid.  He is able to ambulate before discharge from the emergency department.    Patient is stable for discharge at this time, anticipatory guidance provided, Tylenol or ibuprofen for discomfort, close follow-up is encouraged, and strict ED return instructions have been detailed. Patient is agreeable to the disposition and plan.    Patient's blood pressure was elevated in the emergency department, and has been referred to primary care for close monitoring.      FINAL IMPRESSION  (N48.30) Priapism      Electronically signed by: Sofía Johnson D.O., 6/18/2021 12:41 PM      This dictation was created using voice recognition software. The accuracy of the dictation is limited to the abilities of the software. I expect there may be some errors of grammar and possibly content. The nursing notes were reviewed and certain aspects of this information were incorporated into this note.

## 2021-06-18 NOTE — ED NOTES
Pt given discharge instructions/ home care instructions explained, pt verbalized understanding of instructions given/pt understands the importance of follow up, pt ambulatory to ER ti.

## 2021-06-24 ENCOUNTER — HOSPITAL ENCOUNTER (EMERGENCY)
Facility: MEDICAL CENTER | Age: 61
End: 2021-06-24
Attending: EMERGENCY MEDICINE
Payer: COMMERCIAL

## 2021-06-24 VITALS
DIASTOLIC BLOOD PRESSURE: 60 MMHG | RESPIRATION RATE: 18 BRPM | HEIGHT: 66 IN | HEART RATE: 70 BPM | SYSTOLIC BLOOD PRESSURE: 135 MMHG | TEMPERATURE: 97.1 F | BODY MASS INDEX: 33.98 KG/M2 | OXYGEN SATURATION: 98 %

## 2021-06-24 DIAGNOSIS — N48.30 PRIAPISM: ICD-10-CM

## 2021-06-24 PROCEDURE — 306637 HCHG MISC ORTHO ITEM RC 0274

## 2021-06-24 PROCEDURE — 99283 EMERGENCY DEPT VISIT LOW MDM: CPT

## 2021-06-24 PROCEDURE — 54220 IRRG CRPRA CAVRNOSA PRIAPISM: CPT

## 2021-06-24 PROCEDURE — 700101 HCHG RX REV CODE 250: Performed by: EMERGENCY MEDICINE

## 2021-06-24 RX ORDER — LIDOCAINE HYDROCHLORIDE 10 MG/ML
20 INJECTION, SOLUTION INFILTRATION; PERINEURAL ONCE
Status: COMPLETED | OUTPATIENT
Start: 2021-06-24 | End: 2021-06-24

## 2021-06-24 RX ADMIN — LIDOCAINE HYDROCHLORIDE 20 ML: 10 INJECTION, SOLUTION INFILTRATION; PERINEURAL at 06:15

## 2021-06-24 NOTE — ED TRIAGE NOTES
"Chief Complaint   Patient presents with   • Priapism     x 4 hrs. Denies ED meds. Seen 1 wk ago for same c/o.      Pt arrived for above c/o.     /80   Pulse 73   Temp 36.2 °C (97.1 °F) (Temporal)   Resp 17   Ht 1.676 m (5' 6\")   SpO2 98%   BMI 33.98 kg/m²   "

## 2021-06-24 NOTE — ED PROVIDER NOTES
ED Provider Note    ED Provider Note    Primary care provider: Kasandra Sun P.A.-C.  Means of arrival: POV  History obtained from: patient  History limited by: None    CHIEF COMPLAINT  Chief Complaint   Patient presents with   • Priapism     x 4 hrs. Denies ED meds. Seen 1 wk ago for same c/o.        HPI  Jovon Whatley is a 60 y.o. male who presents to the Emergency Department with priapism.  He states that he got up to go to the bathroom about 2:00 in the morning and noticed he had an erection.  He thought it would go away.  Now, about 4 hours later, he presents for evaluation as it has persisted.  Patient had similar symptoms about a week ago and was seen here in this emergency department.  He states this is the fourth time in total that it happened.  Prior to a week ago, it was about 12 years since his last incident.  He denies ever following up with urology.  He had plan to since his most recent ED visit but has not had a chance.  He denies use of any stimulant drugs.  He denies use of any erectile dysfunction medications.  No history of sickle cell disease.  He is diabetic with hypertension and hyperlipidemia.    REVIEW OF SYSTEMS  Review of Systems   Constitutional: Negative for chills and fever.   HENT: Negative for congestion.    Respiratory: Negative for cough.    Cardiovascular: Negative for chest pain.   Gastrointestinal: Negative for vomiting.   Neurological: Negative for headaches.   All other systems reviewed and are negative.      PAST MEDICAL HISTORY   has a past medical history of Diabetes (HCC), Hyperlipidemia, and Hypertension.    SURGICAL HISTORY   has a past surgical history that includes ankle orif (Right) and appendectomy.    SOCIAL HISTORY  Social History     Tobacco Use   • Smoking status: Never Smoker   • Smokeless tobacco: Never Used   Vaping Use   • Vaping Use: Never used   Substance Use Topics   • Alcohol use: Never   • Drug use: Yes     Frequency: 5.0 times per week     Types:  "Marijuana, Inhaled     Comment: Marijuana To relax       Social History     Substance and Sexual Activity   Drug Use Yes   • Frequency: 5.0 times per week   • Types: Marijuana, Inhaled    Comment: Marijuana To relax        FAMILY HISTORY  Family History   Problem Relation Age of Onset   • Other Father         heart valve   • Heart Disease Father    • Cancer Mother         brain cancer   • Diabetes Neg Hx    • Hypertension Neg Hx    • Hyperlipidemia Neg Hx    • Stroke Neg Hx        CURRENT MEDICATIONS  Home Medications    **Home medications have not yet been reviewed for this encounter**         ALLERGIES  Allergies   Allergen Reactions   • Ace Inhibitors Cough       PHYSICAL EXAM  VITAL SIGNS: /60   Pulse 70   Temp 36.2 °C (97.1 °F) (Temporal)   Resp 18   Ht 1.676 m (5' 6\")   SpO2 98%   BMI 33.98 kg/m²   Vitals reviewed.  Constitutional: Patient is oriented to person, place, and time. Appears well-developed and well-nourished.  Mild distress.    Head: Normocephalic and atraumatic.   Ears: Normal external ears bilaterally.   Eyes: Conjunctivae are normal.  Cardiovascular: Normal rate, regular rhythm and normal heart sounds.  Pulmonary/Chest: Effort normal and breath sounds normal. No respiratory distress, no wheezes, rhonchi, or rales.   Abdominal: Soft. Bowel sounds are normal. There is no tenderness. No rebound or guarding, or peritoneal signs.  : Erect penis.  Normal male genitalia  Musculoskeletal: No edema and no tenderness.   Neurological: No focal deficits.   Skin: Skin is warm and dry. No erythema. No pallor.   Psychiatric: Patient has a normal mood and affect.     Procedure: Corporal aspiration and irrigation and infusion of phenylephrine.  Penis was prepped and draped in sterile fashion.  Dorsal penile block was obtained using lidocaine 1% without epinephrine.  In addition, lidocaine was used to infused locally over the site of injection.  A 14-gauge Angiocath was inserted into each corpus " cavernosum muscle body.  There was immediate return of dark blood.  Blood was aspirated as well as allowed to passively drain.  The corpus cavernosum, was irrigated and 2 mL of phenylephrine was instilled into each side.  After an observation period, the penis did detumesce was flaccid and the Angiocath were removed.  Patient tolerated procedure well.    COURSE & MEDICAL DECISION MAKING  Pertinent Labs & Imaging studies reviewed. (See chart for details)    Obtained and reviewed past medical records.  Patient's last encounter was June 18 he was seen for priapism.    5:56 AM - Patient seen and examined at bedside.  This is a pleasant 60-year-old male who presents with priapism.  Seen here in the ED about a week ago with similar symptoms.  Will plan for irrigation and drainage of the corpus cavernosum.      0705AM URology paged    0739AM D/W Urology , Dr. Ochoa, who will ensure follow-up this next week.  He also recommends if the patient again presents with similar symptoms that we get an ABG on the blood obtained from the cavernosum which may help identify high flow versus low flow and ischemic versus nonischemic priapism.  He will see the patient early next week.  He also recommends Coban wrapping which will be undertaken as well as he hematology/oncology follow-up which can be either undertaken through his records or the patient is also advised on speaking with his primary care doctor regarding this to help uncover any other blood dyscrasias, that may explain his priapism.    After continued observation..  No further symptoms.  Penis is flaccid.  This point, I feel he can safely be discharged home.  He is given instructions on follow-up with his PCP as well as strict return precautions. He is discharged home in stable condition.      FINAL IMPRESSION  1. Priapism

## 2021-06-24 NOTE — ED NOTES
Nonadhesive dsg applied on penis, with coban (cut into 1 inch thickness) applied loosely around shaft of penis per urology recommendation to prevent return of edema.

## 2021-06-24 NOTE — ED NOTES
Discharge teaching and paperwork provided regarding priapism and all questions/concerns answered. VSS, assessment stable. Given information regarding f/u with urology. Patient discharged and ambulatory out of the ED.

## 2021-06-24 NOTE — DISCHARGE INSTRUCTIONS
Please contact Dr. Ochoa, with [facility] [place] early next week, they have assured me, they will see you in follow-up.  It is also important that you follow-up with hematology/oncology to help uncover any other blood disorders you may have that are causing priapism.  This can be undertaken through your primary care provider, Kasandra Barrera

## 2021-06-24 NOTE — ED NOTES
Report received from EV Doty. No priapism since procedure, ot currently pain-free. Washcloths provided to clean old blood (bleeding controlled).

## 2021-06-25 ASSESSMENT — ENCOUNTER SYMPTOMS
COUGH: 0
HEADACHES: 0
CHILLS: 0
VOMITING: 0
FEVER: 0

## 2022-08-04 ENCOUNTER — TELEPHONE (OUTPATIENT)
Dept: HEALTH INFORMATION MANAGEMENT | Facility: OTHER | Age: 62
End: 2022-08-04

## 2022-08-04 NOTE — TELEPHONE ENCOUNTER
Outcome: Left Message    Please transfer to Patient Outreach Team at [phone] when patient returns call.        Attempt # 2

## 2023-09-08 ENCOUNTER — OFFICE VISIT (OUTPATIENT)
Dept: MEDICAL GROUP | Facility: PHYSICIAN GROUP | Age: 63
End: 2023-09-08
Payer: COMMERCIAL

## 2023-09-08 VITALS
HEIGHT: 66 IN | HEART RATE: 77 BPM | OXYGEN SATURATION: 96 % | BODY MASS INDEX: 32.14 KG/M2 | WEIGHT: 200 LBS | TEMPERATURE: 97.6 F | SYSTOLIC BLOOD PRESSURE: 132 MMHG | DIASTOLIC BLOOD PRESSURE: 84 MMHG | RESPIRATION RATE: 16 BRPM

## 2023-09-08 DIAGNOSIS — E78.5 HYPERLIPIDEMIA ASSOCIATED WITH TYPE 2 DIABETES MELLITUS (HCC): ICD-10-CM

## 2023-09-08 DIAGNOSIS — E66.9 OBESITY (BMI 30-39.9): ICD-10-CM

## 2023-09-08 DIAGNOSIS — E55.9 VITAMIN D DEFICIENCY: ICD-10-CM

## 2023-09-08 DIAGNOSIS — Z76.89 ENCOUNTER TO ESTABLISH CARE: ICD-10-CM

## 2023-09-08 DIAGNOSIS — E11.69 DM TYPE 2 WITH DIABETIC DYSLIPIDEMIA (HCC): ICD-10-CM

## 2023-09-08 DIAGNOSIS — E11.69 HYPERLIPIDEMIA ASSOCIATED WITH TYPE 2 DIABETES MELLITUS (HCC): ICD-10-CM

## 2023-09-08 DIAGNOSIS — I15.2 HYPERTENSION ASSOCIATED WITH DIABETES (HCC): ICD-10-CM

## 2023-09-08 DIAGNOSIS — Z13.0 SCREENING FOR DEFICIENCY ANEMIA: ICD-10-CM

## 2023-09-08 DIAGNOSIS — E11.59 HYPERTENSION ASSOCIATED WITH DIABETES (HCC): ICD-10-CM

## 2023-09-08 DIAGNOSIS — Z13.29 THYROID DISORDER SCREEN: ICD-10-CM

## 2023-09-08 DIAGNOSIS — E78.5 DM TYPE 2 WITH DIABETIC DYSLIPIDEMIA (HCC): ICD-10-CM

## 2023-09-08 PROBLEM — R05.3 CHRONIC COUGH: Status: RESOLVED | Noted: 2020-03-17 | Resolved: 2023-09-08

## 2023-09-08 PROCEDURE — 3079F DIAST BP 80-89 MM HG: CPT

## 2023-09-08 PROCEDURE — 3075F SYST BP GE 130 - 139MM HG: CPT

## 2023-09-08 PROCEDURE — 99214 OFFICE O/P EST MOD 30 MIN: CPT

## 2023-09-08 RX ORDER — OLMESARTAN MEDOXOMIL 20 MG/1
20 TABLET ORAL DAILY
Qty: 30 TABLET | Refills: 1 | Status: SHIPPED | OUTPATIENT
Start: 2023-09-08 | End: 2023-11-13

## 2023-09-08 ASSESSMENT — PATIENT HEALTH QUESTIONNAIRE - PHQ9: CLINICAL INTERPRETATION OF PHQ2 SCORE: 0

## 2023-09-08 NOTE — ASSESSMENT & PLAN NOTE
Chronic, unstable. Last A1C 6.8% 4/23/21. Previously taking actos 15 mg daily, metformin 1000 mg, glipizide 5 mg daily. Not currently taking any medications. Reports he did not like the way metformin made him feel.   Elicits he is frequently drinking diet soda, cooks mainly at home, rare fast food, walking 2 miles daily.   Will recheck labs and follow up.

## 2023-09-08 NOTE — ASSESSMENT & PLAN NOTE
Chronic, unstable. Previously on losartan. Not currently taking anything. bp in clinic today 132/84.  Denies headaches, chest pain, palpitations, le swelling, vision changes. Labs noted in dentis office 172/102, 147/93.   Will get updated labs and follow up  Start olmesartan 20 mg daily. Collect home readings, follow up next week.

## 2023-09-08 NOTE — ASSESSMENT & PLAN NOTE
Chronic, unstable. Reports he is walking 2 miles daily, cooking mostly at home, frequently drinking soda.    Heaviest 220 lbs  Current weight 200 lbs

## 2023-09-08 NOTE — ASSESSMENT & PLAN NOTE
Chronic, unstable. Previously taking simvastatin 10 mg daily. Will get updated labs and follow up.  The ASCVD Risk score (Apolinar BAKER, et al., 2019) failed to calculate.

## 2023-09-08 NOTE — PROGRESS NOTES
"Subjective:     CC: Diagnoses of DM type 2 with diabetic dyslipidemia (HCC), Hypertension associated with diabetes (HCC), Hyperlipidemia associated with type 2 diabetes mellitus (HCC), Obesity (BMI 30-39.9), Thyroid disorder screen, Vitamin D deficiency, Screening for deficiency anemia, and Encounter to establish care were pertinent to this visit.    Pt presents today to establish care with me. Prior PCP della Sun. 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     Operates fork lift. Single, lives with son and RUPA (97 yo).   Likes riding dirt bikes.     HPI:   Jovon presents today with    Problem   Obesity (Bmi 30-39.9)   DM Type 2 With Diabetic Dyslipidemia (Hcc)    This is a chronic condition.  Current medications:  Insulin:   Biguanide:   GLP1-RA:    SGLT-2i:      DPP4-I:   TZD:   Anika:  Sulfonyluria:     Last A1c:  Last Microalb/Cr ratio:  Fasting sugars:  Last diabetic foot exam:  Last retinal eye exam:  ACEi/ARB?   Statin?   Aspirin?   Concomitant HTN?   Nightly foot checks?       Hypertension associated with diabetes (HCC)   Hyperlipidemia associated with type 2 diabetes mellitus (HCC)   Chronic Cough (Resolved)     ROS:  Review of Systems   HENT:          Missing lower teeth s/p getting hit in the face with baseball. Having pain, especially with eating. Planning extraction   All other systems reviewed and are negative.      Objective:     Exam:  /84 (BP Location: Left arm, Patient Position: Sitting, BP Cuff Size: Small adult)   Pulse 77   Temp 36.4 °C (97.6 °F) (Temporal)   Resp 16   Ht 1.676 m (5' 6\")   Wt 90.7 kg (200 lb)   SpO2 96%   BMI 32.28 kg/m²  Body mass index is 32.28 kg/m².    Physical Exam  Vitals reviewed.   Constitutional:       General: He is not in acute distress.     Appearance: Normal appearance. He is obese. He is not ill-appearing.   HENT:      Head: Normocephalic and atraumatic.      Right Ear: Tympanic membrane, ear canal and external ear normal.      Left Ear: Tympanic " membrane, ear canal and external ear normal.      Mouth/Throat:      Dentition: Abnormal dentition (missing lower teeth/dental & gum pain). Has dentures (top).   Cardiovascular:      Rate and Rhythm: Normal rate and regular rhythm.      Pulses: Normal pulses.      Heart sounds: Normal heart sounds. No murmur heard.  Pulmonary:      Effort: Pulmonary effort is normal. No respiratory distress.      Breath sounds: Normal breath sounds. No wheezing.   Abdominal:      General: Bowel sounds are normal.   Musculoskeletal:         General: No swelling, tenderness or deformity. Normal range of motion.   Skin:     General: Skin is warm and dry.      Capillary Refill: Capillary refill takes less than 2 seconds.   Neurological:      General: No focal deficit present.      Mental Status: He is alert and oriented to person, place, and time.      Cranial Nerves: No cranial nerve deficit.      Sensory: No sensory deficit.      Motor: No weakness.   Psychiatric:         Mood and Affect: Mood normal.         Behavior: Behavior normal.       Assessment & Plan:     63 y.o. male with the following -     Problem List Items Addressed This Visit       DM type 2 with diabetic dyslipidemia (HCC)     Chronic, unstable. Last A1C 6.8% 4/23/21. Previously taking actos 15 mg daily, metformin 1000 mg, glipizide 5 mg daily. Not currently taking any medications. Reports he did not like the way metformin made him feel.   Elicits he is frequently drinking diet soda, cooks mainly at home, rare fast food, walking 2 miles daily.   Will recheck labs and follow up.         Relevant Orders    HEMOGLOBIN A1C    TSH    Comp Metabolic Panel    MICROALBUMIN CREAT RATIO URINE    Hypertension associated with diabetes (HCC)     Chronic, unstable. Previously on losartan. Not currently taking anything. bp in clinic today 132/84.  Denies headaches, chest pain, palpitations, le swelling, vision changes. Labs noted in dentis office 172/102, 147/93.   Will get updated  labs and follow up  Start olmesartan 20 mg daily. Collect home readings, follow up next week.           Relevant Medications    olmesartan (BENICAR) 20 MG Tab    Other Relevant Orders    Comp Metabolic Panel    Hyperlipidemia associated with type 2 diabetes mellitus (HCC)     Chronic, unstable. Previously taking simvastatin 10 mg daily. Will get updated labs and follow up.  The ASCVD Risk score (Apolinar BAKER, et al., 2019) failed to calculate.           Relevant Medications    olmesartan (BENICAR) 20 MG Tab    Other Relevant Orders    Lipid Profile    Obesity (BMI 30-39.9)     Chronic, unstable. Reports he is walking 2 miles daily, cooking mostly at home, frequently drinking soda.    Heaviest 220 lbs  Current weight 200 lbs         Relevant Orders    Patient identified as having weight management issue.  Appropriate orders and counseling given.     Other Visit Diagnoses       Thyroid disorder screen        Vitamin D deficiency        Relevant Orders    VITAMIN D,25 HYDROXY (DEFICIENCY)    Screening for deficiency anemia        Relevant Orders    CBC WITHOUT DIFFERENTIAL    Encounter to establish care              Patient was educated in proper administration of medication(s) ordered today including safety, possible SE, risks, benefits, rationale and alternatives to therapy.   Supportive care, differential diagnoses, and indications for immediate follow-up discussed with patient.    Pathogenesis of diagnosis discussed including typical length and natural progression.    Instructed to return to clinic or nearest emergency department for any change in condition, further concerns, or worsening of symptoms.  Patient states understanding of the plan of care and discharge instructions.    Return in about 1 week (around 9/15/2023) for Labs, Diabetes.    Please note that this dictation was created using voice recognition software. I have made every reasonable attempt to correct obvious errors, but I expect that there are errors  of grammar and possibly content that I did not discover before finalizing the note.

## 2023-09-11 ENCOUNTER — HOSPITAL ENCOUNTER (OUTPATIENT)
Dept: LAB | Facility: MEDICAL CENTER | Age: 63
End: 2023-09-11
Payer: COMMERCIAL

## 2023-09-11 DIAGNOSIS — E11.59 HYPERTENSION ASSOCIATED WITH DIABETES (HCC): ICD-10-CM

## 2023-09-11 DIAGNOSIS — Z13.0 SCREENING FOR DEFICIENCY ANEMIA: ICD-10-CM

## 2023-09-11 DIAGNOSIS — I15.2 HYPERTENSION ASSOCIATED WITH DIABETES (HCC): ICD-10-CM

## 2023-09-11 DIAGNOSIS — E78.5 DM TYPE 2 WITH DIABETIC DYSLIPIDEMIA (HCC): ICD-10-CM

## 2023-09-11 DIAGNOSIS — E11.69 HYPERLIPIDEMIA ASSOCIATED WITH TYPE 2 DIABETES MELLITUS (HCC): ICD-10-CM

## 2023-09-11 DIAGNOSIS — E55.9 VITAMIN D DEFICIENCY: ICD-10-CM

## 2023-09-11 DIAGNOSIS — E11.69 DM TYPE 2 WITH DIABETIC DYSLIPIDEMIA (HCC): ICD-10-CM

## 2023-09-11 DIAGNOSIS — E78.5 HYPERLIPIDEMIA ASSOCIATED WITH TYPE 2 DIABETES MELLITUS (HCC): ICD-10-CM

## 2023-09-11 LAB
25(OH)D3 SERPL-MCNC: 19 NG/ML (ref 30–100)
ALBUMIN SERPL BCP-MCNC: 4.4 G/DL (ref 3.2–4.9)
ALBUMIN/GLOB SERPL: 1.5 G/DL
ALP SERPL-CCNC: 112 U/L (ref 30–99)
ALT SERPL-CCNC: 20 U/L (ref 2–50)
ANION GAP SERPL CALC-SCNC: 12 MMOL/L (ref 7–16)
AST SERPL-CCNC: 9 U/L (ref 12–45)
BILIRUB SERPL-MCNC: 0.6 MG/DL (ref 0.1–1.5)
BUN SERPL-MCNC: 14 MG/DL (ref 8–22)
CALCIUM ALBUM COR SERPL-MCNC: 9.1 MG/DL (ref 8.5–10.5)
CALCIUM SERPL-MCNC: 9.4 MG/DL (ref 8.5–10.5)
CHLORIDE SERPL-SCNC: 98 MMOL/L (ref 96–112)
CHOLEST SERPL-MCNC: 180 MG/DL (ref 100–199)
CO2 SERPL-SCNC: 22 MMOL/L (ref 20–33)
CREAT SERPL-MCNC: 0.82 MG/DL (ref 0.5–1.4)
CREAT UR-MCNC: 186.59 MG/DL
ERYTHROCYTE [DISTWIDTH] IN BLOOD BY AUTOMATED COUNT: 38 FL (ref 35.9–50)
EST. AVERAGE GLUCOSE BLD GHB EST-MCNC: 375 MG/DL
GFR SERPLBLD CREATININE-BSD FMLA CKD-EPI: 100 ML/MIN/1.73 M 2
GLOBULIN SER CALC-MCNC: 2.9 G/DL (ref 1.9–3.5)
GLUCOSE SERPL-MCNC: 285 MG/DL (ref 65–99)
HBA1C MFR BLD: 14.7 % (ref 4–5.6)
HCT VFR BLD AUTO: 46.1 % (ref 42–52)
HDLC SERPL-MCNC: 35 MG/DL
HGB BLD-MCNC: 15.1 G/DL (ref 14–18)
LDLC SERPL CALC-MCNC: 105 MG/DL
MCH RBC QN AUTO: 26 PG (ref 27–33)
MCHC RBC AUTO-ENTMCNC: 32.8 G/DL (ref 32.3–36.5)
MCV RBC AUTO: 79.3 FL (ref 81.4–97.8)
MICROALBUMIN UR-MCNC: 1.8 MG/DL
MICROALBUMIN/CREAT UR: 10 MG/G (ref 0–30)
PLATELET # BLD AUTO: 242 K/UL (ref 164–446)
PMV BLD AUTO: 11.4 FL (ref 9–12.9)
POTASSIUM SERPL-SCNC: 4.2 MMOL/L (ref 3.6–5.5)
PROT SERPL-MCNC: 7.3 G/DL (ref 6–8.2)
RBC # BLD AUTO: 5.81 M/UL (ref 4.7–6.1)
SODIUM SERPL-SCNC: 132 MMOL/L (ref 135–145)
TRIGL SERPL-MCNC: 202 MG/DL (ref 0–149)
TSH SERPL DL<=0.005 MIU/L-ACNC: 1.25 UIU/ML (ref 0.38–5.33)
WBC # BLD AUTO: 12 K/UL (ref 4.8–10.8)

## 2023-09-11 PROCEDURE — 84443 ASSAY THYROID STIM HORMONE: CPT

## 2023-09-11 PROCEDURE — 36415 COLL VENOUS BLD VENIPUNCTURE: CPT

## 2023-09-11 PROCEDURE — 85027 COMPLETE CBC AUTOMATED: CPT

## 2023-09-11 PROCEDURE — 80053 COMPREHEN METABOLIC PANEL: CPT

## 2023-09-11 PROCEDURE — 82570 ASSAY OF URINE CREATININE: CPT

## 2023-09-11 PROCEDURE — 82306 VITAMIN D 25 HYDROXY: CPT

## 2023-09-11 PROCEDURE — 80061 LIPID PANEL: CPT

## 2023-09-11 PROCEDURE — 83036 HEMOGLOBIN GLYCOSYLATED A1C: CPT

## 2023-09-11 PROCEDURE — 82043 UR ALBUMIN QUANTITATIVE: CPT

## 2023-09-18 ENCOUNTER — OFFICE VISIT (OUTPATIENT)
Dept: MEDICAL GROUP | Facility: PHYSICIAN GROUP | Age: 63
End: 2023-09-18
Payer: COMMERCIAL

## 2023-09-18 VITALS
HEART RATE: 91 BPM | TEMPERATURE: 98.7 F | OXYGEN SATURATION: 93 % | WEIGHT: 200 LBS | HEIGHT: 66 IN | DIASTOLIC BLOOD PRESSURE: 78 MMHG | SYSTOLIC BLOOD PRESSURE: 122 MMHG | BODY MASS INDEX: 32.14 KG/M2

## 2023-09-18 DIAGNOSIS — D72.829 LEUKOCYTOSIS, UNSPECIFIED TYPE: ICD-10-CM

## 2023-09-18 DIAGNOSIS — I15.2 HYPERTENSION ASSOCIATED WITH DIABETES (HCC): ICD-10-CM

## 2023-09-18 DIAGNOSIS — E78.5 HYPERLIPIDEMIA ASSOCIATED WITH TYPE 2 DIABETES MELLITUS (HCC): ICD-10-CM

## 2023-09-18 DIAGNOSIS — E78.5 DM TYPE 2 WITH DIABETIC DYSLIPIDEMIA (HCC): ICD-10-CM

## 2023-09-18 DIAGNOSIS — E11.59 HYPERTENSION ASSOCIATED WITH DIABETES (HCC): ICD-10-CM

## 2023-09-18 DIAGNOSIS — E55.9 VITAMIN D DEFICIENCY: ICD-10-CM

## 2023-09-18 DIAGNOSIS — R71.8 LOW MEAN CORPUSCULAR VOLUME (MCV): ICD-10-CM

## 2023-09-18 DIAGNOSIS — E11.69 DM TYPE 2 WITH DIABETIC DYSLIPIDEMIA (HCC): ICD-10-CM

## 2023-09-18 DIAGNOSIS — E11.69 HYPERLIPIDEMIA ASSOCIATED WITH TYPE 2 DIABETES MELLITUS (HCC): ICD-10-CM

## 2023-09-18 PROCEDURE — 3074F SYST BP LT 130 MM HG: CPT

## 2023-09-18 PROCEDURE — 3078F DIAST BP <80 MM HG: CPT

## 2023-09-18 PROCEDURE — 99214 OFFICE O/P EST MOD 30 MIN: CPT

## 2023-09-18 RX ORDER — ASPIRIN 81 MG/1
81 TABLET, CHEWABLE ORAL DAILY
Qty: 100 TABLET | Refills: 3 | Status: SHIPPED | OUTPATIENT
Start: 2023-09-18

## 2023-09-18 RX ORDER — SIMVASTATIN 10 MG
TABLET ORAL
Qty: 90 TABLET | Refills: 3 | Status: SHIPPED | OUTPATIENT
Start: 2023-09-18

## 2023-09-18 RX ORDER — METFORMIN HYDROCHLORIDE 500 MG/1
500 TABLET, EXTENDED RELEASE ORAL DAILY
Qty: 90 TABLET | Refills: 3 | Status: SHIPPED | OUTPATIENT
Start: 2023-09-18

## 2023-09-18 ASSESSMENT — FIBROSIS 4 INDEX: FIB4 SCORE: 0.51

## 2023-09-18 NOTE — ASSESSMENT & PLAN NOTE
Chronic, unstable. Discussed healthy lifestyle recommendations.   The 10-year ASCVD risk score (Apolinar BAKER, et al., 2019) is: 21.3%  Restart simvastatin 10 mg daily

## 2023-09-18 NOTE — PROGRESS NOTES
"Subjective:     CC: Diagnoses of Leukocytosis, unspecified type, Low mean corpuscular volume (MCV), DM type 2 with diabetic dyslipidemia (HCC), Hypertension associated with diabetes (HCC), Hyperlipidemia associated with type 2 diabetes mellitus (HCC), and Vitamin D deficiency were pertinent to this visit.    HPI:   Jovon presents today with    Problem   Elevated Wbcs   Vitamin D Deficiency    ICD-10 transition     DM Type 2 With Diabetic Dyslipidemia (Hcc)    This is a chronic condition.  Current medications:  Insulin:   Biguanide: metformin xr 500 mg daily, reports poor tolerance to metformin in the past  GLP1-RA:  semaglutide 0.25 weekly  SGLT-2i:      DPP4-I:   TZD:   Anika:  Sulfonyluria:     Last A1c: 9/11/23 14.7%  Last Microalb/Cr ratio: 9/11/23 1.8  Fasting sugars:  Last diabetic foot exam: 9/18/23  Last retinal eye exam:  ACEi/ARB? olmesartan 20 mg daily  Statin? Simvastatin 10 mg daily  Aspirin? Aspirin 81 mg daily  Concomitant HTN?   Nightly foot checks? encouraged       Hypertension associated with diabetes (HCC)   Hyperlipidemia associated with type 2 diabetes mellitus (HCC)     ROS:  Review of Systems   Constitutional:  Positive for malaise/fatigue.   All other systems reviewed and are negative.      Objective:     Exam:  /78 (BP Location: Left arm, Patient Position: Sitting, BP Cuff Size: Small adult)   Pulse 91   Temp 37.1 °C (98.7 °F) (Temporal)   Ht 1.676 m (5' 6\")   Wt 90.7 kg (200 lb)   SpO2 93%   BMI 32.28 kg/m²  Body mass index is 32.28 kg/m².    Physical Exam  Vitals reviewed.   Constitutional:       General: He is not in acute distress.     Appearance: Normal appearance. He is not ill-appearing.   HENT:      Head: Normocephalic and atraumatic.   Cardiovascular:      Rate and Rhythm: Normal rate.      Pulses: Normal pulses.           Dorsalis pedis pulses are 2+ on the right side and 2+ on the left side.   Pulmonary:      Effort: Pulmonary effort is normal. No respiratory distress. "   Feet:      Right foot:      Protective Sensation: 10 sites tested.  10 sites sensed.      Skin integrity: Skin integrity normal.      Toenail Condition: Right toenails are normal.      Left foot:      Protective Sensation: 10 sites tested.  10 sites sensed.      Skin integrity: Skin integrity normal.      Toenail Condition: Left toenails are normal.      Comments: Monofilament testing with a 10 gram force: sensation intact: intact bilaterally  Visual Inspection: Feet without maceration, ulcers, fissures.  Pedal pulses: intact bilaterally    Skin:     General: Skin is warm and dry.      Findings: No rash.   Neurological:      General: No focal deficit present.      Mental Status: He is alert and oriented to person, place, and time.   Psychiatric:         Mood and Affect: Mood normal.         Behavior: Behavior normal.         Labs:    Latest Reference Range & Units 09/11/23 15:27   WBC 4.8 - 10.8 K/uL 12.0 (H)   RBC 4.70 - 6.10 M/uL 5.81   Hemoglobin 14.0 - 18.0 g/dL 15.1   Hematocrit 42.0 - 52.0 % 46.1   MCV 81.4 - 97.8 fL 79.3 (L)   MCH 27.0 - 33.0 pg 26.0 (L)   MCHC 32.3 - 36.5 g/dL 32.8   RDW 35.9 - 50.0 fL 38.0   Platelet Count 164 - 446 K/uL 242   MPV 9.0 - 12.9 fL 11.4   Sodium 135 - 145 mmol/L 132 (L)   Potassium 3.6 - 5.5 mmol/L 4.2   Chloride 96 - 112 mmol/L 98   Co2 20 - 33 mmol/L 22   Anion Gap 7.0 - 16.0  12.0   Glucose 65 - 99 mg/dL 285 (H)   Bun 8 - 22 mg/dL 14   Creatinine 0.50 - 1.40 mg/dL 0.82   GFR (CKD-EPI) >60 mL/min/1.73 m 2 100   Calcium 8.5 - 10.5 mg/dL 9.4   Correct Calcium 8.5 - 10.5 mg/dL 9.1   AST(SGOT) 12 - 45 U/L 9 (L)   ALT(SGPT) 2 - 50 U/L 20   Alkaline Phosphatase 30 - 99 U/L 112 (H)   Total Bilirubin 0.1 - 1.5 mg/dL 0.6   Albumin 3.2 - 4.9 g/dL 4.4   Total Protein 6.0 - 8.2 g/dL 7.3   Globulin 1.9 - 3.5 g/dL 2.9   A-G Ratio g/dL 1.5   Glycohemoglobin 4.0 - 5.6 % 14.7 (H)   Estim. Avg Glu mg/dL 375   Cholesterol,Tot 100 - 199 mg/dL 180   Triglycerides 0 - 149 mg/dL 202 (H)   HDL  >=40 mg/dL 35 !   LDL <100 mg/dL 105 (H)   Micro Alb Creat Ratio 0 - 30 mg/g 10   Creatinine, Urine mg/dL 186.59   Microalbumin, Urine Random mg/dL 1.8   25-Hydroxy   Vitamin D 25 30 - 100 ng/mL 19 (L)   TSH 0.380 - 5.330 uIU/mL 1.250   (H): Data is abnormally high  (L): Data is abnormally low  !: Data is abnormal    Assessment & Plan:     63 y.o. male with the following -     Problem List Items Addressed This Visit       DM type 2 with diabetic dyslipidemia (HCC)     Chronic, unstable. Will restart antidiabetes medications.   Metformin has been difficult to tolerate in the past, will try long acting formula.   semaglutide 0.25 mg weekly.           Relevant Medications    metFORMIN ER (GLUCOPHAGE XR) 500 MG TABLET SR 24 HR    Semaglutide, 1 MG/DOSE, 2 MG/1.5ML Solution Pen-injector    simvastatin (ZOCOR) 10 MG Tab    Other Relevant Orders    Diabetic Monofilament Lower Extremity Exam (Completed)    POCT Retinal Eye Exam    Hypertension associated with diabetes (HCC)     Chronic, stable. Continue olmesartan 20 mg daily.          Relevant Medications    metFORMIN ER (GLUCOPHAGE XR) 500 MG TABLET SR 24 HR    Semaglutide, 1 MG/DOSE, 2 MG/1.5ML Solution Pen-injector    simvastatin (ZOCOR) 10 MG Tab    Hyperlipidemia associated with type 2 diabetes mellitus (HCC)     Chronic, unstable. Discussed healthy lifestyle recommendations.   The 10-year ASCVD risk score (Apolinar BAKER, et al., 2019) is: 21.3%  Restart simvastatin 10 mg daily         Relevant Medications    metFORMIN ER (GLUCOPHAGE XR) 500 MG TABLET SR 24 HR    Semaglutide, 1 MG/DOSE, 2 MG/1.5ML Solution Pen-injector    simvastatin (ZOCOR) 10 MG Tab    Vitamin D deficiency     Chronic, unstable. Recommend once daily vit d3 otc paired with calcium to optimize absorption.          Elevated WBCs     Will repeat CBC with diff  No fevers/ s/s of infection, recent illness.           Relevant Orders    CBC WITH DIFFERENTIAL     Other Visit Diagnoses       Low mean corpuscular  volume (MCV)        Relevant Orders    IRON/TOTAL IRON BIND    FERRITIN    VITAMIN B12          Patient was educated in proper administration of medication(s) ordered today including safety, possible SE, risks, benefits, rationale and alternatives to therapy.   Supportive care, differential diagnoses, and indications for immediate follow-up discussed with patient.    Pathogenesis of diagnosis discussed including typical length and natural progression.    Instructed to return to clinic or nearest emergency department for any change in condition, further concerns, or worsening of symptoms.  Patient states understanding of the plan of care and discharge instructions.    Return in about 3 months (around 12/18/2023), or if symptoms worsen or fail to improve, for A1C, Diabetes.    Please note that this dictation was created using voice recognition software. I have made every reasonable attempt to correct obvious errors, but I expect that there are errors of grammar and possibly content that I did not discover before finalizing the note.

## 2023-09-18 NOTE — ASSESSMENT & PLAN NOTE
Chronic, unstable. Will restart antidiabetes medications.   Metformin has been difficult to tolerate in the past, will try long acting formula.   semaglutide 0.25 mg weekly.

## 2023-09-21 DIAGNOSIS — E78.5 DM TYPE 2 WITH DIABETIC DYSLIPIDEMIA (HCC): ICD-10-CM

## 2023-09-21 DIAGNOSIS — E11.69 DM TYPE 2 WITH DIABETIC DYSLIPIDEMIA (HCC): ICD-10-CM

## 2023-11-10 DIAGNOSIS — I15.2 HYPERTENSION ASSOCIATED WITH DIABETES (HCC): ICD-10-CM

## 2023-11-10 DIAGNOSIS — E11.59 HYPERTENSION ASSOCIATED WITH DIABETES (HCC): ICD-10-CM

## 2023-11-13 RX ORDER — OLMESARTAN MEDOXOMIL 20 MG/1
20 TABLET ORAL DAILY
Qty: 90 TABLET | Refills: 0 | Status: SHIPPED | OUTPATIENT
Start: 2023-11-13

## 2023-12-18 ENCOUNTER — APPOINTMENT (OUTPATIENT)
Dept: MEDICAL GROUP | Facility: PHYSICIAN GROUP | Age: 63
End: 2023-12-18
Payer: COMMERCIAL

## 2023-12-20 ENCOUNTER — OFFICE VISIT (OUTPATIENT)
Dept: MEDICAL GROUP | Facility: PHYSICIAN GROUP | Age: 63
End: 2023-12-20
Payer: COMMERCIAL

## 2023-12-20 VITALS
TEMPERATURE: 97.6 F | SYSTOLIC BLOOD PRESSURE: 122 MMHG | OXYGEN SATURATION: 97 % | HEART RATE: 89 BPM | DIASTOLIC BLOOD PRESSURE: 84 MMHG | WEIGHT: 202 LBS | BODY MASS INDEX: 32.47 KG/M2 | HEIGHT: 66 IN

## 2023-12-20 DIAGNOSIS — E78.5 DM TYPE 2 WITH DIABETIC DYSLIPIDEMIA (HCC): ICD-10-CM

## 2023-12-20 DIAGNOSIS — E11.69 DM TYPE 2 WITH DIABETIC DYSLIPIDEMIA (HCC): ICD-10-CM

## 2023-12-20 DIAGNOSIS — E11.65 TYPE 2 DIABETES MELLITUS WITH HYPERGLYCEMIA, WITHOUT LONG-TERM CURRENT USE OF INSULIN (HCC): ICD-10-CM

## 2023-12-20 LAB
HBA1C MFR BLD: 14.8 % (ref ?–5.8)
POCT INT CON NEG: NEGATIVE
POCT INT CON POS: POSITIVE

## 2023-12-20 PROCEDURE — 83036 HEMOGLOBIN GLYCOSYLATED A1C: CPT

## 2023-12-20 PROCEDURE — 99214 OFFICE O/P EST MOD 30 MIN: CPT

## 2023-12-20 PROCEDURE — 3079F DIAST BP 80-89 MM HG: CPT

## 2023-12-20 PROCEDURE — 3074F SYST BP LT 130 MM HG: CPT

## 2023-12-20 RX ORDER — LANCETS 30 GAUGE
EACH MISCELLANEOUS
Qty: 100 EACH | Refills: 3 | Status: SHIPPED | OUTPATIENT
Start: 2023-12-20

## 2023-12-20 RX ORDER — GLUCOSAMINE HCL/CHONDROITIN SU 500-400 MG
CAPSULE ORAL
Qty: 100 EACH | Refills: 3 | Status: SHIPPED | OUTPATIENT
Start: 2023-12-20

## 2023-12-20 ASSESSMENT — FIBROSIS 4 INDEX: FIB4 SCORE: 0.51

## 2023-12-20 NOTE — PROGRESS NOTES
"Subjective:     CC: Diagnoses of Type 2 diabetes mellitus with hyperglycemia, without long-term current use of insulin (HCC) and DM type 2 with diabetic dyslipidemia (HCC) were pertinent to this visit.    HPI:   Jovon presents today with    Problem   Type 2 Diabetes Mellitus With Hyperglycemia (Hcc)    This is a chronic condition.  Current medications:  Insulin:   Biguanide: metformin xr 500 mg daily, reports poor tolerance to metformin in the past  GLP1-RA:  semaglutide 0.25 weekly  SGLT-2i:      DPP4-I:   TZD:   Anika:  Sulfonyluria:     Last A1c: 12/20/23 14.8%; 9/11/23 14.7%  Last Microalb/Cr ratio: 9/11/23 1.8  Fasting sugars:  Last diabetic foot exam: 9/18/23  Last retinal eye exam:  ACEi/ARB? olmesartan 20 mg daily  Statin? Simvastatin 10 mg daily  Aspirin? Aspirin 81 mg daily  Concomitant HTN?   Nightly foot checks? encouraged         ROS:  Review of Systems   All other systems reviewed and are negative.      Objective:     Exam:  /84 (BP Location: Right arm, Patient Position: Sitting, BP Cuff Size: Small adult)   Pulse 89   Temp 36.4 °C (97.6 °F) (Temporal)   Ht 1.676 m (5' 6\")   Wt 91.6 kg (202 lb)   SpO2 97%   BMI 32.60 kg/m²  Body mass index is 32.6 kg/m².    Physical Exam  Vitals reviewed.   Constitutional:       General: He is not in acute distress.     Appearance: Normal appearance. He is not ill-appearing.   HENT:      Head: Normocephalic and atraumatic.   Cardiovascular:      Rate and Rhythm: Normal rate.      Pulses: Normal pulses.   Pulmonary:      Effort: Pulmonary effort is normal. No respiratory distress.   Skin:     General: Skin is warm and dry.      Findings: No rash.   Neurological:      General: No focal deficit present.      Mental Status: He is alert and oriented to person, place, and time.   Psychiatric:         Mood and Affect: Mood normal.         Behavior: Behavior normal.       Assessment & Plan:     63 y.o. male with the following -     Problem List Items Addressed This " Visit       Type 2 diabetes mellitus with hyperglycemia (HCC)     Chronic, unstable. Reports taking metformin 500 mg xr daily, no negative side effects from this medication.   He denies exercising. Diet has not changed.  Recommend increasing this to 1000 mg BID. Has not started GLP-1i, but did get a note from insurance company that it will be covered.   Consider starting lantus 10 units nightly.   Follow up 1 month  Consider pharmacotherapy   Referral for diabetes education  Declines CGM  Will provide glucometer for home monitoring.         Relevant Medications    Semaglutide (WEGOVY) 0.25 MG/0.5ML Solution Auto-injector Pen-injector    Blood Glucose Meter Kit    Blood Glucose Test Strips    Lancets    Alcohol Swabs    Other Relevant Orders    POCT A1C (Completed)    Referral to Diabetic Education     Other Visit Diagnoses       DM type 2 with diabetic dyslipidemia (HCC)              Patient was educated in proper administration of medication(s) ordered today including safety, possible SE, risks, benefits, rationale and alternatives to therapy.   Supportive care, differential diagnoses, and indications for immediate follow-up discussed with patient.    Pathogenesis of diagnosis discussed including typical length and natural progression.    Instructed to return to clinic or nearest emergency department for any change in condition, further concerns, or worsening of symptoms.  Patient states understanding of the plan of care and discharge instructions.    Return in about 4 weeks (around 1/17/2024) for Diabetes.    Please note that this dictation was created using voice recognition software. I have made every reasonable attempt to correct obvious errors, but I expect that there are errors of grammar and possibly content that I did not discover before finalizing the note.

## 2023-12-20 NOTE — ASSESSMENT & PLAN NOTE
Chronic, unstable. Reports taking metformin 500 mg xr daily, no negative side effects from this medication.   He denies exercising. Diet has not changed.  Recommend increasing this to 1000 mg BID. Has not started GLP-1i, but did get a note from insurance company that it will be covered.   Consider starting lantus 10 units nightly.   Follow up 1 month  Consider pharmacotherapy   Referral for diabetes education  Declines CGM  Will provide glucometer for home monitoring.

## 2024-05-21 DIAGNOSIS — E78.5 HYPERLIPIDEMIA ASSOCIATED WITH TYPE 2 DIABETES MELLITUS (HCC): ICD-10-CM

## 2024-05-21 DIAGNOSIS — E11.69 HYPERLIPIDEMIA ASSOCIATED WITH TYPE 2 DIABETES MELLITUS (HCC): ICD-10-CM

## 2024-05-23 ENCOUNTER — TELEPHONE (OUTPATIENT)
Dept: HEALTH INFORMATION MANAGEMENT | Facility: OTHER | Age: 64
End: 2024-05-23
Payer: COMMERCIAL

## 2024-07-10 ENCOUNTER — OFFICE VISIT (OUTPATIENT)
Dept: URGENT CARE | Facility: PHYSICIAN GROUP | Age: 64
End: 2024-07-10
Payer: COMMERCIAL

## 2024-07-10 ENCOUNTER — APPOINTMENT (OUTPATIENT)
Dept: RADIOLOGY | Facility: IMAGING CENTER | Age: 64
End: 2024-07-10
Attending: STUDENT IN AN ORGANIZED HEALTH CARE EDUCATION/TRAINING PROGRAM
Payer: COMMERCIAL

## 2024-07-10 VITALS
DIASTOLIC BLOOD PRESSURE: 65 MMHG | SYSTOLIC BLOOD PRESSURE: 130 MMHG | HEIGHT: 66 IN | BODY MASS INDEX: 31.02 KG/M2 | RESPIRATION RATE: 16 BRPM | TEMPERATURE: 97.6 F | OXYGEN SATURATION: 97 % | HEART RATE: 91 BPM | WEIGHT: 193 LBS

## 2024-07-10 DIAGNOSIS — M25.512 ACUTE PAIN OF LEFT SHOULDER: ICD-10-CM

## 2024-07-10 DIAGNOSIS — M25.522 LEFT ELBOW PAIN: ICD-10-CM

## 2024-07-10 DIAGNOSIS — S50.02XA CONTUSION OF LEFT ELBOW, INITIAL ENCOUNTER: ICD-10-CM

## 2024-07-10 PROCEDURE — 73030 X-RAY EXAM OF SHOULDER: CPT | Mod: TC,LT | Performed by: RADIOLOGY

## 2024-07-10 PROCEDURE — 73080 X-RAY EXAM OF ELBOW: CPT | Mod: TC,LT | Performed by: RADIOLOGY

## 2024-07-10 PROCEDURE — 3078F DIAST BP <80 MM HG: CPT | Performed by: STUDENT IN AN ORGANIZED HEALTH CARE EDUCATION/TRAINING PROGRAM

## 2024-07-10 PROCEDURE — 99214 OFFICE O/P EST MOD 30 MIN: CPT | Performed by: STUDENT IN AN ORGANIZED HEALTH CARE EDUCATION/TRAINING PROGRAM

## 2024-07-10 PROCEDURE — 3075F SYST BP GE 130 - 139MM HG: CPT | Performed by: STUDENT IN AN ORGANIZED HEALTH CARE EDUCATION/TRAINING PROGRAM

## 2024-07-10 ASSESSMENT — ENCOUNTER SYMPTOMS
TINGLING: 0
FEVER: 0
CHILLS: 0
SENSORY CHANGE: 0

## 2024-07-10 ASSESSMENT — FIBROSIS 4 INDEX: FIB4 SCORE: 0.51

## 2024-09-27 ENCOUNTER — OFFICE VISIT (OUTPATIENT)
Dept: URGENT CARE | Facility: PHYSICIAN GROUP | Age: 64
End: 2024-09-27
Payer: COMMERCIAL

## 2024-09-27 VITALS
HEART RATE: 90 BPM | DIASTOLIC BLOOD PRESSURE: 70 MMHG | OXYGEN SATURATION: 98 % | WEIGHT: 196 LBS | HEIGHT: 66 IN | BODY MASS INDEX: 31.5 KG/M2 | RESPIRATION RATE: 16 BRPM | TEMPERATURE: 98.3 F | SYSTOLIC BLOOD PRESSURE: 112 MMHG

## 2024-09-27 DIAGNOSIS — M79.661 PAIN IN RIGHT LOWER LEG: ICD-10-CM

## 2024-09-27 DIAGNOSIS — Z86.39 HISTORY OF DIABETES MELLITUS: ICD-10-CM

## 2024-09-27 DIAGNOSIS — M54.10 RADICULAR PAIN OF RIGHT LOWER EXTREMITY: ICD-10-CM

## 2024-09-27 PROCEDURE — 3078F DIAST BP <80 MM HG: CPT | Performed by: NURSE PRACTITIONER

## 2024-09-27 PROCEDURE — 99213 OFFICE O/P EST LOW 20 MIN: CPT | Performed by: NURSE PRACTITIONER

## 2024-09-27 PROCEDURE — 3074F SYST BP LT 130 MM HG: CPT | Performed by: NURSE PRACTITIONER

## 2024-09-27 RX ORDER — GABAPENTIN 300 MG/1
300 CAPSULE ORAL 3 TIMES DAILY
Qty: 30 CAPSULE | Refills: 0 | Status: SHIPPED | OUTPATIENT
Start: 2024-09-27

## 2024-09-27 RX ORDER — NAPROXEN 500 MG/1
500 TABLET ORAL EVERY 12 HOURS PRN
Qty: 30 TABLET | Refills: 0 | Status: SHIPPED | OUTPATIENT
Start: 2024-09-27

## 2024-09-27 ASSESSMENT — FIBROSIS 4 INDEX: FIB4 SCORE: 0.52

## 2024-09-27 ASSESSMENT — ENCOUNTER SYMPTOMS
CONSTITUTIONAL NEGATIVE: 1
WEAKNESS: 0
SENSORY CHANGE: 0
FEVER: 0
NEUROLOGICAL NEGATIVE: 1
BACK PAIN: 0
GASTROINTESTINAL NEGATIVE: 1
LEG PAIN: 1

## 2024-09-27 ASSESSMENT — VISUAL ACUITY: OU: 1

## 2024-09-27 NOTE — PROGRESS NOTES
Subjective:     Jovon Whatley is a 64 y.o. male who presents for Nerve Pain (Sciatica pain x 3 weeks )       Leg Pain  This is a new problem. The problem has been gradually worsening. Pertinent negatives include no fever or weakness.     Patient reports 1 month ago, he started experiencing new symptoms of bilateral thigh and lower leg pain.  Reports pain at both of his feet.  Reports symptoms worsen with lying down as well as with walking.  Not worse with palpation.  Using aspirin with mild relief in symptoms.  Denies back pain, sensory changes, weakness, bladder/bladder dysfunction, or other symptoms.    Denies injury.  Reports he works as a .    Reports history of diabetes and blood sugars have been high.    Review of Systems   Constitutional: Negative.  Negative for fever.   Gastrointestinal: Negative.    Genitourinary: Negative.    Musculoskeletal:  Negative for back pain.        Per HPI   Neurological: Negative.  Negative for sensory change and weakness.   All other systems reviewed and are negative.    Refer to HPI for additional details.    During this visit, appropriate PPE was worn, and hand hygiene was performed.    PMH:  has a past medical history of Chronic cough (3/17/2020), Diabetes (HCC), Hyperlipidemia, and Hypertension.    MEDS:   Current Outpatient Medications:     gabapentin (NEURONTIN) 300 MG Cap, Take 1 Capsule by mouth 3 times a day., Disp: 30 Capsule, Rfl: 0    naproxen (NAPROSYN) 500 MG Tab, Take 1 Tablet by mouth every 12 hours as needed (Pain/inflammation)., Disp: 30 Tablet, Rfl: 0    Semaglutide (WEGOVY) 0.25 MG/0.5ML Solution Auto-injector Pen-injector, Inject 0.5 mL under the skin every 7 days., Disp: 2 mL, Rfl: 1    Blood Glucose Meter Kit, Test blood sugar as recommended by provider. Per insurance preference blood glucose monitoring kit., Disp: 1 Kit, Rfl: 0    Blood Glucose Test Strips, Use one per insurance preference strip to test blood sugar once daily early  "morning before first meal., Disp: 100 Strip, Rfl: 3    Lancets, Use one per insurance preference lancet to test blood sugar once daily early morning before first meal., Disp: 100 Each, Rfl: 3    Alcohol Swabs, Wipe site with prep pad prior to injection., Disp: 100 Each, Rfl: 3    olmesartan (BENICAR) 20 MG Tab, TAKE 1 TABLET BY MOUTH EVERY DAY, Disp: 90 Tablet, Rfl: 0    metFORMIN ER (GLUCOPHAGE XR) 500 MG TABLET SR 24 HR, Take 1 Tablet by mouth every day., Disp: 90 Tablet, Rfl: 3    aspirin (ASA) 81 MG Chew Tab chewable tablet, Chew 1 Tablet every day., Disp: 100 Tablet, Rfl: 3    simvastatin (ZOCOR) 10 MG Tab, TAKE 1 TABLET BY MOUTH EVERY DAY IN THE EVENING, Disp: 90 Tablet, Rfl: 3    ALLERGIES: No Known Allergies  SURGHX:   Past Surgical History:   Procedure Laterality Date    APPENDECTOMY      ORIF, ANKLE Right      SOCHX:  reports that he has never smoked. He has never used smokeless tobacco. He reports current drug use. Frequency: 5.00 times per week. Drugs: Marijuana and Inhaled. He reports that he does not drink alcohol.    FH: Per HPI as applicable/pertinent.      Objective:     /70 (BP Location: Right arm, Patient Position: Sitting, BP Cuff Size: Adult)   Pulse 90   Temp 36.8 °C (98.3 °F) (Temporal)   Resp 16   Ht 1.676 m (5' 6\")   Wt 88.9 kg (196 lb)   SpO2 98%   BMI 31.64 kg/m²     Physical Exam  Nursing note reviewed.   Constitutional:       General: He is not in acute distress.     Appearance: He is well-developed. He is not ill-appearing or toxic-appearing.   Eyes:      General: Vision grossly intact.   Cardiovascular:      Rate and Rhythm: Normal rate.   Pulmonary:      Effort: Pulmonary effort is normal. No respiratory distress.   Musculoskeletal:         General: No deformity.      Lumbar back: No swelling, deformity or tenderness. Normal range of motion. Positive right straight leg raise test (Increased pain at lateral ankle with SLR at 30 and 60 degrees). Negative left straight leg " raise test.      Right hip: Normal range of motion.      Right upper leg: No swelling or tenderness.      Right lower leg: No swelling or tenderness.      Right ankle: No swelling. No tenderness.      Right Achilles Tendon: Normal. No defects.      Right foot: Normal capillary refill. Normal pulse.   Skin:     General: Skin is warm and dry.      Coloration: Skin is not pale.      Findings: No bruising or erythema.   Neurological:      Mental Status: He is alert and oriented to person, place, and time.      Motor: No weakness.   Psychiatric:         Behavior: Behavior normal. Behavior is cooperative.       Assessment/Plan:     1. Pain in right lower leg  - gabapentin (NEURONTIN) 300 MG Cap; Take 1 Capsule by mouth 3 times a day.  Dispense: 30 Capsule; Refill: 0  - naproxen (NAPROSYN) 500 MG Tab; Take 1 Tablet by mouth every 12 hours as needed (Pain/inflammation).  Dispense: 30 Tablet; Refill: 0    2. Radicular pain of right lower extremity    3. History of diabetes mellitus    Rx as above sent electronically. Stop aspirin.    Possible diabetic neuropathy. Follow up with PCP.    Monitor. Warning signs reviewed. Return precautions advised.     Differential diagnosis, natural history, supportive care, over-the-counter symptom management per 's instructions, close monitoring, and indications for immediate follow-up discussed.     All questions answered. Patient agrees with the plan of care.    Discharge summary provided via [employer].

## 2024-11-27 ENCOUNTER — OFFICE VISIT (OUTPATIENT)
Dept: MEDICAL GROUP | Facility: PHYSICIAN GROUP | Age: 64
End: 2024-11-27
Payer: COMMERCIAL

## 2024-11-27 ENCOUNTER — HOSPITAL ENCOUNTER (OUTPATIENT)
Dept: LAB | Facility: MEDICAL CENTER | Age: 64
End: 2024-11-27
Payer: COMMERCIAL

## 2024-11-27 VITALS
HEIGHT: 66 IN | SYSTOLIC BLOOD PRESSURE: 132 MMHG | BODY MASS INDEX: 28.61 KG/M2 | DIASTOLIC BLOOD PRESSURE: 84 MMHG | OXYGEN SATURATION: 98 % | HEART RATE: 109 BPM | RESPIRATION RATE: 16 BRPM | TEMPERATURE: 98 F | WEIGHT: 178 LBS

## 2024-11-27 DIAGNOSIS — E11.59 HYPERTENSION ASSOCIATED WITH DIABETES (HCC): ICD-10-CM

## 2024-11-27 DIAGNOSIS — E11.65 TYPE 2 DIABETES MELLITUS WITH HYPERGLYCEMIA, WITHOUT LONG-TERM CURRENT USE OF INSULIN (HCC): ICD-10-CM

## 2024-11-27 DIAGNOSIS — E11.40 NEUROPATHY DUE TO TYPE 2 DIABETES MELLITUS (HCC): ICD-10-CM

## 2024-11-27 DIAGNOSIS — R20.2 TINGLING OF BOTH FEET: ICD-10-CM

## 2024-11-27 DIAGNOSIS — E11.69 HYPERLIPIDEMIA ASSOCIATED WITH TYPE 2 DIABETES MELLITUS (HCC): ICD-10-CM

## 2024-11-27 DIAGNOSIS — M54.41 CHRONIC RIGHT-SIDED LOW BACK PAIN WITH RIGHT-SIDED SCIATICA: ICD-10-CM

## 2024-11-27 DIAGNOSIS — Z12.5 ENCOUNTER FOR SCREENING FOR MALIGNANT NEOPLASM OF PROSTATE: ICD-10-CM

## 2024-11-27 DIAGNOSIS — I15.2 HYPERTENSION ASSOCIATED WITH DIABETES (HCC): ICD-10-CM

## 2024-11-27 DIAGNOSIS — Z13.0 SCREENING FOR DEFICIENCY ANEMIA: ICD-10-CM

## 2024-11-27 DIAGNOSIS — E55.9 VITAMIN D DEFICIENCY: ICD-10-CM

## 2024-11-27 DIAGNOSIS — Z91.148 NONCOMPLIANCE W/MEDICATION TREATMENT DUE TO INTERMIT USE OF MEDICATION: ICD-10-CM

## 2024-11-27 DIAGNOSIS — M79.661 PAIN IN RIGHT LOWER LEG: ICD-10-CM

## 2024-11-27 DIAGNOSIS — Z13.29 THYROID DISORDER SCREEN: ICD-10-CM

## 2024-11-27 DIAGNOSIS — E53.8 VITAMIN B 12 DEFICIENCY: ICD-10-CM

## 2024-11-27 DIAGNOSIS — E78.5 HYPERLIPIDEMIA ASSOCIATED WITH TYPE 2 DIABETES MELLITUS (HCC): ICD-10-CM

## 2024-11-27 DIAGNOSIS — G89.29 CHRONIC RIGHT-SIDED LOW BACK PAIN WITH RIGHT-SIDED SCIATICA: ICD-10-CM

## 2024-11-27 LAB
25(OH)D3 SERPL-MCNC: 24 NG/ML (ref 30–100)
ALBUMIN SERPL BCP-MCNC: 4.6 G/DL (ref 3.2–4.9)
ALBUMIN/GLOB SERPL: 1.6 G/DL
ALP SERPL-CCNC: 112 U/L (ref 30–99)
ALT SERPL-CCNC: 18 U/L (ref 2–50)
ANION GAP SERPL CALC-SCNC: 14 MMOL/L (ref 7–16)
AST SERPL-CCNC: 12 U/L (ref 12–45)
BILIRUB SERPL-MCNC: 0.5 MG/DL (ref 0.1–1.5)
BUN SERPL-MCNC: 13 MG/DL (ref 8–22)
CALCIUM ALBUM COR SERPL-MCNC: 8.7 MG/DL (ref 8.5–10.5)
CALCIUM SERPL-MCNC: 9.2 MG/DL (ref 8.5–10.5)
CHLORIDE SERPL-SCNC: 99 MMOL/L (ref 96–112)
CHOLEST SERPL-MCNC: 166 MG/DL (ref 100–199)
CO2 SERPL-SCNC: 22 MMOL/L (ref 20–33)
CREAT SERPL-MCNC: 0.68 MG/DL (ref 0.5–1.4)
ERYTHROCYTE [DISTWIDTH] IN BLOOD BY AUTOMATED COUNT: 38.5 FL (ref 35.9–50)
EST. AVERAGE GLUCOSE BLD GHB EST-MCNC: 301 MG/DL
FASTING STATUS PATIENT QL REPORTED: NORMAL
GFR SERPLBLD CREATININE-BSD FMLA CKD-EPI: 105 ML/MIN/1.73 M 2
GLOBULIN SER CALC-MCNC: 2.9 G/DL (ref 1.9–3.5)
GLUCOSE SERPL-MCNC: 362 MG/DL (ref 65–99)
HBA1C MFR BLD: 12.1 % (ref 4–5.6)
HCT VFR BLD AUTO: 49.8 % (ref 42–52)
HDLC SERPL-MCNC: 35 MG/DL
HGB BLD-MCNC: 16.5 G/DL (ref 14–18)
LDLC SERPL CALC-MCNC: 101 MG/DL
MCH RBC QN AUTO: 26.7 PG (ref 27–33)
MCHC RBC AUTO-ENTMCNC: 33.1 G/DL (ref 32.3–36.5)
MCV RBC AUTO: 80.6 FL (ref 81.4–97.8)
PLATELET # BLD AUTO: 284 K/UL (ref 164–446)
PMV BLD AUTO: 11.5 FL (ref 9–12.9)
POTASSIUM SERPL-SCNC: 4.4 MMOL/L (ref 3.6–5.5)
PROT SERPL-MCNC: 7.5 G/DL (ref 6–8.2)
PSA SERPL DL<=0.01 NG/ML-MCNC: 0.81 NG/ML (ref 0–4)
RBC # BLD AUTO: 6.18 M/UL (ref 4.7–6.1)
SODIUM SERPL-SCNC: 135 MMOL/L (ref 135–145)
TRIGL SERPL-MCNC: 150 MG/DL (ref 0–149)
TSH SERPL-ACNC: 1.41 UIU/ML (ref 0.35–5.5)
VIT B12 SERPL-MCNC: 860 PG/ML (ref 211–911)
WBC # BLD AUTO: 15.4 K/UL (ref 4.8–10.8)

## 2024-11-27 PROCEDURE — 85027 COMPLETE CBC AUTOMATED: CPT

## 2024-11-27 PROCEDURE — 80053 COMPREHEN METABOLIC PANEL: CPT

## 2024-11-27 PROCEDURE — 82306 VITAMIN D 25 HYDROXY: CPT

## 2024-11-27 PROCEDURE — 82607 VITAMIN B-12: CPT

## 2024-11-27 PROCEDURE — 83036 HEMOGLOBIN GLYCOSYLATED A1C: CPT

## 2024-11-27 PROCEDURE — 36415 COLL VENOUS BLD VENIPUNCTURE: CPT

## 2024-11-27 PROCEDURE — 80061 LIPID PANEL: CPT

## 2024-11-27 PROCEDURE — 84153 ASSAY OF PSA TOTAL: CPT

## 2024-11-27 PROCEDURE — 84443 ASSAY THYROID STIM HORMONE: CPT

## 2024-11-27 RX ORDER — LIDOCAINE 50 MG/G
1 PATCH TOPICAL EVERY 24 HOURS
Qty: 10 PATCH | Refills: 3 | Status: SHIPPED | OUTPATIENT
Start: 2024-11-27

## 2024-11-27 RX ORDER — GABAPENTIN 300 MG/1
300 CAPSULE ORAL 3 TIMES DAILY
Qty: 90 CAPSULE | Refills: 0 | Status: SHIPPED | OUTPATIENT
Start: 2024-11-27

## 2024-11-27 ASSESSMENT — FIBROSIS 4 INDEX: FIB4 SCORE: 0.52

## 2024-11-27 ASSESSMENT — ENCOUNTER SYMPTOMS
TINGLING: 1
BACK PAIN: 1

## 2024-11-27 ASSESSMENT — PATIENT HEALTH QUESTIONNAIRE - PHQ9: CLINICAL INTERPRETATION OF PHQ2 SCORE: 0

## 2024-11-27 NOTE — ASSESSMENT & PLAN NOTE
Chronic, unstable. Reports chronic, progressive pain and burning to feet, and sometimes hands. Will try gabapentin for this, discussed nature of uncontrolled blood sugars which can worsen this. Discussed need for close monitoring and follow up.

## 2024-11-27 NOTE — ASSESSMENT & PLAN NOTE
Chronic, unstable diabetes, no follow up, not taking medications for blood sugars, describes poor dietary and nutritional habits.  Discussed increased morbidity and complications of ongoing poorly controlled disease, as well as the need for more frequent follow up in order to find a lifestyle and medication recipe which is both tolerable and effective for managing his disease.

## 2024-11-27 NOTE — ASSESSMENT & PLAN NOTE
Chronic, ongoing. Reports low back pain, ongoing for months. More on the right side. 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 Denies injury or trauma.   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 Reports sharp shooting pains all the way down to the feet. Reports foot pain at night that wakes him up at night/keeps him awake.  Has seen chiropractor for this. Which has not helped symptoms or alleviated discomfort.   Has taken aspirin for pain which he reports some help  Took gabapentin for pain when prescribed by [facility] provider in September 2024, does not think this helped.    Orders:    gabapentin (NEURONTIN) 300 MG Cap; Take 1 Capsule by mouth 3 times a day.    lidocaine (LIDODERM) 5 % Patch; Place 1 Patch on the skin every 24 hours.

## 2024-11-27 NOTE — ASSESSMENT & PLAN NOTE
Chronic, stable. Bp in clinic today 132/84. Taking olmesartan 20 mg daily without problems. Continue this

## 2024-11-27 NOTE — ASSESSMENT & PLAN NOTE
"Chronic, unstable. Due for multiple care gaps  Reports not taking anything for diabetes currently. Reports doesn't think his metformin helped him, and made him feel \"off\". Never received semaglutide, no follow up with me since 12/23.  Uacr, monofilament, retinal exam, updated labs ordered  Will start long acting insulin, try januvia, follow up 1 month  Referral to diabetic education, pharmacotherapy.    Monofilament testing with a 10 gram force: sensation intact: decreased on right 8/10 reduced sensation to heel  Visual Inspection: Feet without maceration, ulcers, fissures. Fungal infection right  Pedal pulses: intact bilaterally      Orders:    Microalbumin Creat Ratio Urine - Clinic Collect; Future    Diabetic Monofilament Lower Extremity Exam    HEMOGLOBIN A1C; Future    Comp Metabolic Panel; Future    Lipid Profile; Future    insulin glargine 100 UNIT/ML SC SOPN injection; Inject 10 Units under the skin every evening.    Insulin Pen Needle 32 G x 4 mm; Use one pen needle in pen device to inject insulin once daily.    SITagliptin (JANUVIA) 50 MG Tab; Take 1 Tablet by mouth every day.    Referral to Diabetic Education    Referral to Pharmacotherapy Service    "

## 2024-11-27 NOTE — PROGRESS NOTES
"Subjective:     CC: Diagnoses of Chronic right-sided low back pain with right-sided sciatica, Type 2 diabetes mellitus with hyperglycemia, without long-term current use of insulin (HCC), Vitamin B 12 deficiency, Screening for deficiency anemia, Thyroid disorder screen, Vitamin D deficiency, Tingling of both feet, Encounter for screening for malignant neoplasm of prostate, Hypertension associated with diabetes (HCC), Hyperlipidemia associated with type 2 diabetes mellitus (HCC), Pain in right lower leg, Neuropathy due to type 2 diabetes mellitus (HCC), and Noncompliance w/medication treatment due to intermit use of medication were pertinent to this visit.    Pt presents today for back pain. He also reports burning extremity pain bilaterally.  He denies taking any medications for his diabetes, states metformin \"never really did anything for him\" and made him feel \"off\".     Discussed the severity of his poorly controlled diabetes is my priority for today's visit, and we can work on his chronic back pain as we work on reducing his blood sugars.    He did admit to having a soda for breakfast today.  Given the infrequency for which I have seen him, I requested he go to the lab after our visit and will plan to follow up 1 month. Discussed necessity to follow up at a minimum of every 3 months to lower blood sugars and gain better control of this disease, to which he says, \"good luck\".     HPI:   Jovon presents today with    Problem   Chronic Right-Sided Low Back Pain With Right-Sided Sciatica   Neuropathy Due to Type 2 Diabetes Mellitus (Hcc)   Noncompliance W/Medication Treatment Due to Intermit Use of Medication   Vitamin D Deficiency    ICD-10 transition     Type 2 Diabetes Mellitus With Hyperglycemia (Hcc)    This is a chronic condition.  Current medications:  Insulin: 11/27/24 start glargine 10 units nightly  Biguanide: unable to tolerate metformin- reports feeling \"off\"   GLP1-RA:  reports unable  to get " "12/23  SGLT-2i:      DPP4-I: 11/27/24 start januvia 50 mg daily  TZD:   Anika:  Sulfonyluria:     Last A1c: 12/20/23 14.8%; 9/11/23 14.7%  Last Microalb/Cr ratio: 9/11/23 1.8  Fasting sugars:  Last diabetic foot exam: 9/18/23  Last retinal eye exam:  ACEi/ARB? olmesartan 20 mg daily  Statin? Simvastatin 10 mg daily  Aspirin? Aspirin 81 mg daily  Concomitant HTN?   Nightly foot checks? encouraged       Hypertension Associated With Diabetes (Hcc)   Hyperlipidemia Associated With Type 2 Diabetes Mellitus (Hcc)     ROS:  Review of Systems   Musculoskeletal:  Positive for back pain (with right sciatica chronic. denies saddle anesthesia or incontinence).   Neurological:  Positive for tingling (burning pain, tingling to feet bilaterally, sometimes hands).   All other systems reviewed and are negative.      Objective:     Exam:  /84 (BP Location: Right arm, Patient Position: Sitting, BP Cuff Size: Adult)   Pulse (!) 109   Temp 36.7 °C (98 °F) (Temporal)   Resp 16   Ht 1.676 m (5' 6\")   Wt 80.7 kg (178 lb)   SpO2 98%   BMI 28.73 kg/m²  Body mass index is 28.73 kg/m².    Physical Exam  Vitals reviewed.   Constitutional:       General: He is not in acute distress.     Appearance: Normal appearance. He is not ill-appearing.   HENT:      Head: Normocephalic and atraumatic.      Mouth/Throat:      Dentition: Abnormal dentition.   Cardiovascular:      Rate and Rhythm: Normal rate.      Pulses: Normal pulses.   Pulmonary:      Effort: Pulmonary effort is normal. No respiratory distress.   Skin:     General: Skin is warm and dry.      Findings: No rash.   Neurological:      General: No focal deficit present.      Mental Status: He is alert and oriented to person, place, and time.      Gait: Gait normal.   Psychiatric:         Mood and Affect: Mood normal.         Behavior: Behavior normal.       Assessment & Plan:     64 y.o. male with the following -     Assessment & Plan  Chronic right-sided low back pain with " "right-sided sciatica  Chronic, ongoing. Reports low back pain, ongoing for months. More on the right side. 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 Denies injury or trauma.   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 Reports sharp shooting pains all the way down to the feet. Reports foot pain at night that wakes him up at night/keeps him awake.  Has seen chiropractor for this. Which has not helped symptoms or alleviated discomfort.   Has taken aspirin for pain which he reports some help  Took gabapentin for pain when prescribed by  provider in September 2024, does not think this helped.    Orders:    gabapentin (NEURONTIN) 300 MG Cap; Take 1 Capsule by mouth 3 times a day.    lidocaine (LIDODERM) 5 % Patch; Place 1 Patch on the skin every 24 hours.    Type 2 diabetes mellitus with hyperglycemia, without long-term current use of insulin ([facility])  Chronic, unstable. Due for multiple care gaps  Reports not taking anything for diabetes currently. Reports doesn't think his metformin helped him, and made him feel \"off\". Never received semaglutide, no follow up with me since 12/23.  Uacr, monofilament, retinal exam, updated labs ordered  Will start long acting insulin, try januvia, follow up 1 month  Referral to diabetic education, pharmacotherapy.    Monofilament testing with a 10 gram force: sensation intact: decreased on right 8/10 reduced sensation to heel  Visual Inspection: Feet without maceration, ulcers, fissures. Fungal infection right  Pedal pulses: intact bilaterally      Orders:    Microalbumin Creat Ratio Urine - Clinic Collect; Future    Diabetic Monofilament Lower Extremity Exam    HEMOGLOBIN A1C; Future    Comp Metabolic Panel; Future    Lipid Profile; Future    insulin glargine 100 UNIT/ML SC SOPN injection; Inject 10 Units under the skin every evening.    Insulin Pen Needle 32 G x 4 mm; Use one pen needle in pen device to inject insulin once daily.    " SITagliptin (JANUVIA) 50 MG Tab; Take 1 Tablet by mouth every day.    Referral to Diabetic Education    Referral to Pharmacotherapy Service    Vitamin B 12 deficiency         Screening for deficiency anemia    Orders:    CBC WITHOUT DIFFERENTIAL; Future    Thyroid disorder screen    Orders:    TSH; Future    Vitamin D deficiency    Orders:    VITAMIN D,25 HYDROXY (DEFICIENCY); Future    Tingling of both feet    Orders:    VITAMIN B12; Future    gabapentin (NEURONTIN) 300 MG Cap; Take 1 Capsule by mouth 3 times a day.    Encounter for screening for malignant neoplasm of prostate    Orders:    PROSTATE SPECIFIC AG SCREENING; Future    Hypertension associated with diabetes (HCC)  Chronic, stable. Bp in clinic today 132/84. Taking olmesartan 20 mg daily without problems. Continue this         Hyperlipidemia associated with type 2 diabetes mellitus (HCC)  Chronic, stable. Tolerating simvastatin 10 mg nightly, asa 81 mg daily.   Continue this.         Pain in right lower leg    Orders:    gabapentin (NEURONTIN) 300 MG Cap; Take 1 Capsule by mouth 3 times a day.    Neuropathy due to type 2 diabetes mellitus (HCC)  Chronic, unstable. Reports chronic, progressive pain and burning to feet, and sometimes hands. Will try gabapentin for this, discussed nature of uncontrolled blood sugars which can worsen this. Discussed need for close monitoring and follow up.           Noncompliance w/medication treatment due to intermit use of medication  Chronic, unstable diabetes, no follow up, not taking medications for blood sugars, describes poor dietary and nutritional habits.  Discussed increased morbidity and complications of ongoing poorly controlled disease, as well as the need for more frequent follow up in order to find a lifestyle and medication recipe which is both tolerable and effective for managing his disease.            Patient was educated in proper administration of medication(s) ordered today including safety, possible  SE, risks, benefits, rationale and alternatives to therapy.   Supportive care, differential diagnoses, and indications for immediate follow-up discussed with patient.    Pathogenesis of diagnosis discussed including typical length and natural progression.    Instructed to return to clinic or nearest emergency department for any change in condition, further concerns, or worsening of symptoms.  Patient states understanding of the plan of care and discharge instructions.    Return if symptoms worsen or fail to improve, for Labs, Diabetes.    I spent a total of 45 minutes with record review, exam, communication with the patient, communication with other providers, and documentation of this encounter.      Please note that this dictation was created using voice recognition software. I have made every reasonable attempt to correct obvious errors, but I expect that there are errors of grammar and possibly content that I did not discover before finalizing the note.

## 2024-12-26 ENCOUNTER — HOSPITAL ENCOUNTER (OUTPATIENT)
Facility: MEDICAL CENTER | Age: 64
End: 2024-12-26
Payer: COMMERCIAL

## 2024-12-26 ENCOUNTER — APPOINTMENT (OUTPATIENT)
Dept: RADIOLOGY | Facility: IMAGING CENTER | Age: 64
End: 2024-12-26
Payer: COMMERCIAL

## 2024-12-26 ENCOUNTER — OFFICE VISIT (OUTPATIENT)
Dept: MEDICAL GROUP | Facility: PHYSICIAN GROUP | Age: 64
End: 2024-12-26
Payer: COMMERCIAL

## 2024-12-26 VITALS
TEMPERATURE: 98.7 F | SYSTOLIC BLOOD PRESSURE: 126 MMHG | RESPIRATION RATE: 14 BRPM | HEIGHT: 66 IN | WEIGHT: 184 LBS | DIASTOLIC BLOOD PRESSURE: 82 MMHG | HEART RATE: 97 BPM | BODY MASS INDEX: 29.57 KG/M2 | OXYGEN SATURATION: 100 %

## 2024-12-26 DIAGNOSIS — G89.29 CHRONIC RIGHT-SIDED LOW BACK PAIN WITH RIGHT-SIDED SCIATICA: ICD-10-CM

## 2024-12-26 DIAGNOSIS — M54.41 CHRONIC RIGHT-SIDED LOW BACK PAIN WITH RIGHT-SIDED SCIATICA: ICD-10-CM

## 2024-12-26 DIAGNOSIS — E11.65 TYPE 2 DIABETES MELLITUS WITH HYPERGLYCEMIA, WITHOUT LONG-TERM CURRENT USE OF INSULIN (HCC): ICD-10-CM

## 2024-12-26 DIAGNOSIS — R20.2 TINGLING OF BOTH FEET: ICD-10-CM

## 2024-12-26 DIAGNOSIS — E11.69 HYPERLIPIDEMIA ASSOCIATED WITH TYPE 2 DIABETES MELLITUS (HCC): ICD-10-CM

## 2024-12-26 DIAGNOSIS — E11.40 NEUROPATHY DUE TO TYPE 2 DIABETES MELLITUS (HCC): ICD-10-CM

## 2024-12-26 DIAGNOSIS — E78.5 HYPERLIPIDEMIA ASSOCIATED WITH TYPE 2 DIABETES MELLITUS (HCC): ICD-10-CM

## 2024-12-26 DIAGNOSIS — M79.661 PAIN IN RIGHT LOWER LEG: ICD-10-CM

## 2024-12-26 LAB
CREAT UR-MCNC: 24.62 MG/DL
MICROALBUMIN UR-MCNC: <1.2 MG/DL
MICROALBUMIN/CREAT UR: NORMAL MG/G (ref 0–30)

## 2024-12-26 PROCEDURE — 82043 UR ALBUMIN QUANTITATIVE: CPT

## 2024-12-26 PROCEDURE — 3079F DIAST BP 80-89 MM HG: CPT

## 2024-12-26 PROCEDURE — 3074F SYST BP LT 130 MM HG: CPT

## 2024-12-26 PROCEDURE — 82570 ASSAY OF URINE CREATININE: CPT

## 2024-12-26 PROCEDURE — 99214 OFFICE O/P EST MOD 30 MIN: CPT | Mod: 25

## 2024-12-26 PROCEDURE — 72100 X-RAY EXAM L-S SPINE 2/3 VWS: CPT | Mod: TC | Performed by: RADIOLOGY

## 2024-12-26 RX ORDER — GABAPENTIN 300 MG/1
300 CAPSULE ORAL 3 TIMES DAILY
Qty: 270 CAPSULE | Refills: 3 | Status: SHIPPED | OUTPATIENT
Start: 2024-12-26

## 2024-12-26 ASSESSMENT — FIBROSIS 4 INDEX: FIB4 SCORE: 0.62

## 2024-12-26 ASSESSMENT — ENCOUNTER SYMPTOMS
TINGLING: 1
SENSORY CHANGE: 1
BACK PAIN: 1

## 2024-12-26 NOTE — PATIENT INSTRUCTIONS
Pharmacotherapy: for medication management/ Uncontrolled Diabetes:  [facility], MICHAEL J  [address] [facility] [address] [address]  Phone: [phone]       Diabetes Education/nutrition  [facility]  [address] [address]  Phone: [phone]

## 2024-12-26 NOTE — ASSESSMENT & PLAN NOTE
Chronic, ongoing. Using gabapentin with improved symptoms and reduced pain/pins and needles. Continue this.

## 2024-12-26 NOTE — ASSESSMENT & PLAN NOTE
Chronic, unstable. Discussed healthy lifestyle recommendations.   Continue simvastatin 10 mg nightly, consider increasing dose. Recheck labs 6-12 months.

## 2024-12-26 NOTE — ASSESSMENT & PLAN NOTE
Will increase lantus by 2-4 units daily to get fasting blood sugar <200.   Reports never received januvia, will follow up with pharmacy.     Will get uacr today.

## 2024-12-26 NOTE — PROGRESS NOTES
Verbal consent was acquired by the patient to use [employer] ambient listening note generation during this visit     Subjective:     CC: Diagnoses of Type 2 diabetes mellitus with hyperglycemia, without long-term current use of insulin (HCC), Chronic right-sided low back pain with right-sided sciatica, Tingling of both feet, Pain in right lower leg, Neuropathy due to type 2 diabetes mellitus (HCC), and Hyperlipidemia associated with type 2 diabetes mellitus (HCC) were pertinent to this visit.    HPI:   Jovon presents today with    History of Present Illness  The patient is a 64-year-old male who presents today for knee pain.    He continues to experience back pain, which he describes as severe and radiating down his legs. He has been under the care of a chiropractor due to the severity of his pain. The chiropractor has recommended an x-ray of his lower back. However, he has not yet followed up on this referral due to a temporary loss of identification and bank cards. He has since regained these documents and plans to contact the referral after this visit. He reports difficulty accessing [employer] and requests assistance with password reset. He is not currently interested in physical therapy or consultation with a spine specialist. His back pain is localized to the lower region, and he suspects it may be due to a pinched nerve or slipped disc. He has been managing his pain with gabapentin, which he finds effective, although he still experiences pain in his right leg, albeit less severe than before. He also uses patches for pain relief but has chosen to discontinue their use to assess his ability to manage without them. His current regimen includes gabapentin 3 times daily, once before work at 5 AM, around 3:30 or 4:00 PM, and at bedtime around 9 PM.    He monitors his blood glucose levels at home, with a recent reading of 309. He admits to occasionally forgetting to check his levels in the morning due to the rush to  leave for work. He was unable to obtain Januvia prescribed medication due to its high cost, which was prescribed in 11/2024. He expresses concern about the potential link between his elevated blood glucose levels and his back and leg pain. He acknowledges a preference for sweet foods. He administers 10 units of insulin every evening at 6 PM, which he tolerates well.        Current Outpatient Medications   Medication Sig Dispense Refill    gabapentin (NEURONTIN) 300 MG Cap Take 1 Capsule by mouth 3 times a day. 270 Capsule 3    insulin glargine 100 UNIT/ML SC SOPN injection Inject 10 Units under the skin every evening. 30 mL 3    Insulin Pen Needle 32 G x 4 mm Use one pen needle in pen device to inject insulin once daily. 100 Each 3    lidocaine (LIDODERM) 5 % Patch Place 1 Patch on the skin every 24 hours. 10 Patch 3    naproxen (NAPROSYN) 500 MG Tab Take 1 Tablet by mouth every 12 hours as needed (Pain/inflammation). 30 Tablet 0    Blood Glucose Meter Kit Test blood sugar as recommended by provider. Per insurance preference blood glucose monitoring kit. 1 Kit 0    Blood Glucose Test Strips Use one per insurance preference strip to test blood sugar once daily early morning before first meal. 100 Strip 3    Lancets Use one per insurance preference lancet to test blood sugar once daily early morning before first meal. 100 Each 3    Alcohol Swabs Wipe site with prep pad prior to injection. 100 Each 3    olmesartan (BENICAR) 20 MG Tab TAKE 1 TABLET BY MOUTH EVERY DAY 90 Tablet 0    aspirin (ASA) 81 MG Chew Tab chewable tablet Chew 1 Tablet every day. 100 Tablet 3    SITagliptin (JANUVIA) 50 MG Tab Take 1 Tablet by mouth every day. 90 Tablet 3    simvastatin (ZOCOR) 10 MG Tab TAKE 1 TABLET BY MOUTH EVERY DAY IN THE EVENING 90 Tablet 3     No current facility-administered medications for this visit.       Problem   Neuropathy Due to Type 2 Diabetes Mellitus (Hcc)   Type 2 Diabetes Mellitus With Hyperglycemia (Hcc)    This  "is a chronic condition.  Current medications:  Insulin: 11/27/24 start glargine 10 units nightly  Biguanide: unable to tolerate metformin- reports feeling \"off\"   GLP1-RA:  reports unable  to get 12/23  SGLT-2i:      DPP4-I: 11/27/24 start januvia 50 mg daily  TZD:   Anika:  Sulfonyluria:     Last A1c: 11/27/24 12.1%;    12/20/23 14.8%; 9/11/23 14.7%  Last Microalb/Cr ratio: 12/26/24 ordered  Fasting sugars:  Last diabetic foot exam: 11/27/24  Last retinal eye exam:  ACEi/ARB? olmesartan 20 mg daily  Statin? Simvastatin 10 mg daily  Aspirin? Aspirin 81 mg daily  Concomitant HTN?   Nightly foot checks? encouraged       Hyperlipidemia Associated With Type 2 Diabetes Mellitus (Hcc)       ROS:  Review of Systems   Musculoskeletal:  Positive for back pain.   Neurological:  Positive for tingling and sensory change.   All other systems reviewed and are negative.      Objective:     Exam:  /82 (BP Location: Right arm, Patient Position: Sitting, BP Cuff Size: Adult)   Pulse 97   Temp 37.1 °C (98.7 °F) (Temporal)   Resp 14   Ht 1.676 m (5' 6\")   Wt 83.5 kg (184 lb)   SpO2 100%   BMI 29.70 kg/m²  Body mass index is 29.7 kg/m².    Physical Exam  Vitals reviewed.   Constitutional:       General: He is not in acute distress.     Appearance: Normal appearance. He is not ill-appearing.   HENT:      Head: Normocephalic and atraumatic.   Cardiovascular:      Rate and Rhythm: Normal rate.      Pulses: Normal pulses.   Pulmonary:      Effort: Pulmonary effort is normal. No respiratory distress.   Skin:     General: Skin is warm and dry.      Findings: No rash.   Neurological:      General: No focal deficit present.      Mental Status: He is alert and oriented to person, place, and time.   Psychiatric:         Mood and Affect: Mood normal.         Behavior: Behavior normal.         Labs:    Latest Reference Range & Units 11/27/24 09:32   WBC 4.8 - 10.8 K/uL 15.4 (H)   RBC 4.70 - 6.10 M/uL 6.18 (H)   Hemoglobin 14.0 - 18.0 " g/dL 16.5   Hematocrit 42.0 - 52.0 % 49.8   MCV 81.4 - 97.8 fL 80.6 (L)   MCH 27.0 - 33.0 pg 26.7 (L)   MCHC 32.3 - 36.5 g/dL 33.1   RDW 35.9 - 50.0 fL 38.5   Platelet Count 164 - 446 K/uL 284   MPV 9.0 - 12.9 fL 11.5   Sodium 135 - 145 mmol/L 135   Potassium 3.6 - 5.5 mmol/L 4.4   Chloride 96 - 112 mmol/L 99   Co2 20 - 33 mmol/L 22   Anion Gap 7.0 - 16.0  14.0   Glucose 65 - 99 mg/dL 362 (H)   Bun 8 - 22 mg/dL 13   Creatinine 0.50 - 1.40 mg/dL 0.68   GFR (CKD-EPI) >60 mL/min/1.73 m 2 105   Calcium 8.5 - 10.5 mg/dL 9.2   Correct Calcium 8.5 - 10.5 mg/dL 8.7   AST(SGOT) 12 - 45 U/L 12   ALT(SGPT) 2 - 50 U/L 18   Alkaline Phosphatase 30 - 99 U/L 112 (H)   Total Bilirubin 0.1 - 1.5 mg/dL 0.5   Albumin 3.2 - 4.9 g/dL 4.6   Total Protein 6.0 - 8.2 g/dL 7.5   Globulin 1.9 - 3.5 g/dL 2.9   A-G Ratio g/dL 1.6   Glycohemoglobin 4.0 - 5.6 % 12.1 (H)   Estim. Avg Glu mg/dL 301   Fasting Status  Fasting   Cholesterol,Tot 100 - 199 mg/dL 166   Triglycerides 0 - 149 mg/dL 150 (H)   HDL >=40 mg/dL 35 !   LDL <100 mg/dL 101 (H)   25-Hydroxy   Vitamin D 25 30 - 100 ng/mL 24 (L)   Prostatic Specific Antigen Tot 0.00 - 4.00 ng/mL 0.81   Vitamin B12 -True Cobalamin 211 - 911 pg/mL 860   TSH 0.350 - 5.500 uIU/mL 1.410   (H): Data is abnormally high  (L): Data is abnormally low  !: Data is abnormal    Assessment & Plan:     64 y.o. male with the following -     Assessment & Plan  1. Back pain.  His back pain could potentially be attributed to a pinched nerve, as evidenced by his symptoms of tingling, numbness, and reduced strength in his legs. An x-ray of his back will be ordered today. A refill of gabapentin has been provided, which he will continue to take 3 times a day. He has been advised to strengthen his core muscles and those supporting his spine to reduce the risk of injury. If he continues to experience pain and numbness, a referral to a spine specialist will be considered.    2. Diabetes mellitus.  His A1c level was recorded at  12.1 in November 2024, indicating that his average blood glucose level over the past 3 months has consistently exceeded 300. He has been advised to gradually increase his insulin dosage by 2 to 4 units daily until his fasting blood glucose level in the morning is below 200. He has been counseled to avoid consuming donuts and sweets. He has been informed that uncontrolled blood glucose levels can lead to various complications, including foot amputations. A urine sample will be collected today to ensure his kidneys are functioning properly. He will increase his insulin dose to 12 units tonight. If his blood glucose levels remain close to 300 over the next few days, he will further increase the dose to 14 units, and subsequently to 16 units if necessary. If his blood glucose levels do not improve, additional insulin may be required throughout the day with meals. The pharmacy will be contacted regarding the status of his Januvia prescription.    Follow-up  The patient is scheduled for a follow-up visit in 3 months, or earlier if necessary, for a re-evaluation of his blood glucose levels.    Problem List Items Addressed This Visit          Family Medicine Problems    Chronic right-sided low back pain with right-sided sciatica    Relevant Medications    gabapentin (NEURONTIN) 300 MG Cap    Other Relevant Orders    DX-LUMBAR SPINE-2 OR 3 VIEWS       Other    Type 2 diabetes mellitus with hyperglycemia (HCC)     Will increase lantus by 2-4 units daily to get fasting blood sugar <200.   Reports never received januvia, will follow up with pharmacy.     Will get uacr today.         Relevant Orders    Microalbumin Creat Ratio Urine - Clinic Collect    Hyperlipidemia associated with type 2 diabetes mellitus (HCC)     Chronic, unstable. Discussed healthy lifestyle recommendations.   Continue simvastatin 10 mg nightly, consider increasing dose. Recheck labs 6-12 months.         Neuropathy due to type 2 diabetes mellitus (HCC)      Chronic, ongoing. Using gabapentin with improved symptoms and reduced pain/pins and needles. Continue this.          Relevant Medications    gabapentin (NEURONTIN) 300 MG Cap     Other Visit Diagnoses       Tingling of both feet        Relevant Medications    gabapentin (NEURONTIN) 300 MG Cap    Pain in right lower leg        Relevant Medications    gabapentin (NEURONTIN) 300 MG Cap          Patient was educated in proper administration of medication(s) ordered today including safety, possible SE, risks, benefits, rationale and alternatives to therapy.   Supportive care, differential diagnoses, and indications for immediate follow-up discussed with patient.    Pathogenesis of diagnosis discussed including typical length and natural progression.    Instructed to return to clinic or nearest emergency department for any change in condition, further concerns, or worsening of symptoms.  Patient states understanding of the plan of care and discharge instructions.    Return in about 3 months (around 3/26/2025), or if symptoms worsen or fail to improve, for A1C.    Please note that this dictation was created using voice recognition software. I have made every reasonable attempt to correct obvious errors, but I expect that there are errors of grammar and possibly content that I did not discover before finalizing the note.

## 2025-01-08 ENCOUNTER — TELEPHONE (OUTPATIENT)
Dept: VASCULAR LAB | Facility: MEDICAL CENTER | Age: 65
End: 2025-01-08
Payer: COMMERCIAL

## 2025-01-09 NOTE — TELEPHONE ENCOUNTER
[facility]     Received diabetes referral from BRADLY Appiah on 11/27/24.     Called patient to schedule an appt to establish care. No answer. LVM.    1st attempt     Insurance: [facility]  [facility]P: [facility]  Locations to be seen: Any    If no response by 02/08/25 (1 month from 1st contact) OR 2 no shows/cancellations, will remove from referral list    Ashtyn Rose, PharmD  [facility]  Phone [phone]

## 2025-01-15 ENCOUNTER — TELEPHONE (OUTPATIENT)
Dept: HEALTH INFORMATION MANAGEMENT | Facility: OTHER | Age: 65
End: 2025-01-15
Payer: COMMERCIAL

## 2025-07-16 RX ORDER — CALCIUM CITRATE/VITAMIN D3 200MG-6.25
TABLET ORAL
Qty: 100 STRIP | Refills: 3 | Status: SHIPPED | OUTPATIENT
Start: 2025-07-16

## 2025-07-16 NOTE — TELEPHONE ENCOUNTER
Received request via: Patient    Was the patient seen in the last year in this department? Yes    Does the patient have an active prescription (recently filled or refills available) for medication(s) requested? No    Pharmacy Name:   [facility]/pharmacy #[id number] - [place] - [address]  [address] [place] [address]  Phone: [phone] Fax: [phone]        Does the patient have longterm Plus and need 100-day supply? (This applies to ALL medications) Patient does not have SCP